# Patient Record
Sex: FEMALE | Race: WHITE | NOT HISPANIC OR LATINO | Employment: OTHER | URBAN - METROPOLITAN AREA
[De-identification: names, ages, dates, MRNs, and addresses within clinical notes are randomized per-mention and may not be internally consistent; named-entity substitution may affect disease eponyms.]

---

## 2020-07-13 LAB — HBA1C MFR BLD HPLC: 5.3 %

## 2020-08-06 ENCOUNTER — DOCUMENTATION (OUTPATIENT)
Dept: SURGERY | Facility: CLINIC | Age: 60
End: 2020-08-06

## 2020-08-06 ENCOUNTER — TELEPHONE (OUTPATIENT)
Dept: SURGERY | Facility: CLINIC | Age: 60
End: 2020-08-06

## 2020-08-06 ENCOUNTER — OFFICE VISIT (OUTPATIENT)
Dept: SURGERY | Facility: CLINIC | Age: 60
End: 2020-08-06
Payer: COMMERCIAL

## 2020-08-06 VITALS
WEIGHT: 146.4 LBS | SYSTOLIC BLOOD PRESSURE: 118 MMHG | DIASTOLIC BLOOD PRESSURE: 84 MMHG | TEMPERATURE: 97.6 F | HEART RATE: 60 BPM | RESPIRATION RATE: 16 BRPM

## 2020-08-06 DIAGNOSIS — L03.031 CELLULITIS OF TOE OF RIGHT FOOT: Primary | ICD-10-CM

## 2020-08-06 DIAGNOSIS — Z98.84 BARIATRIC SURGERY STATUS: ICD-10-CM

## 2020-08-06 PROCEDURE — 99214 OFFICE O/P EST MOD 30 MIN: CPT | Performed by: SURGERY

## 2020-08-06 RX ORDER — CEPHALEXIN 500 MG/1
500 CAPSULE ORAL EVERY 6 HOURS SCHEDULED
Qty: 20 CAPSULE | Refills: 0 | Status: SHIPPED | OUTPATIENT
Start: 2020-08-06 | End: 2020-08-11

## 2020-08-06 RX ORDER — OXYBUTYNIN CHLORIDE 10 MG/1
10 TABLET, EXTENDED RELEASE ORAL DAILY
COMMUNITY
Start: 2020-07-16

## 2020-08-06 RX ORDER — BUPROPION HYDROCHLORIDE 150 MG/1
80 TABLET ORAL DAILY
COMMUNITY
Start: 2020-06-09

## 2020-08-06 RX ORDER — LINACLOTIDE 290 UG/1
290 CAPSULE, GELATIN COATED ORAL DAILY
COMMUNITY
Start: 2020-06-22

## 2020-08-06 RX ORDER — PANTOPRAZOLE SODIUM 40 MG/1
40 TABLET, DELAYED RELEASE ORAL DAILY
COMMUNITY
Start: 2020-06-22 | End: 2022-03-28

## 2020-08-06 RX ORDER — CLONAZEPAM 1 MG/1
1 TABLET ORAL AS NEEDED
COMMUNITY
Start: 2020-07-27 | End: 2022-06-21

## 2020-08-06 RX ORDER — TRAZODONE HYDROCHLORIDE 150 MG/1
150 TABLET ORAL DAILY
COMMUNITY
Start: 2020-06-10

## 2020-08-06 NOTE — PROGRESS NOTES
Assessment/Plan:  She is doing very well as far as bariatric surgery is concerned  Her diabetes has resolved  Her hemoglobin A1c is 5 3  She is not on blood pressure medications  She is taking vitamins regularly  She does have cellulitis of her right 1st toe for which I am prescribing her 5 days of antibiotics  I told her to give me a call in case there is increased pain, swelling, drainage from her toe  She verbalized understanding  No problem-specific Assessment & Plan notes found for this encounter  Diagnoses and all orders for this visit:    Cellulitis of toe of right foot  -     cephalexin (KEFLEX) 500 mg capsule; Take 1 capsule (500 mg total) by mouth every 6 (six) hours for 5 days    Bariatric surgery status    Other orders  -     buPROPion (WELLBUTRIN XL) 150 mg 24 hr tablet; Take 80 mg by mouth daily  -     clonazePAM (KlonoPIN) 1 mg tablet; Take 1 mg by mouth as needed  -     vitamin B-12 (CYANOCOBALAMIN) 250 MCG tablet; Take 250 mcg by mouth daily  -     Linzess 290 MCG CAPS; Take 290 mg by mouth daily  -     lurasidone (Latuda) 80 mg tablet; Take 80 mg by mouth  -     oxybutynin (DITROPAN-XL) 10 MG 24 hr tablet; Take 10 mg by mouth daily  -     pantoprazole (PROTONIX) 40 mg tablet; Take 40 mg by mouth daily  -     traZODone (DESYREL) 150 mg tablet; Take 150 mg by mouth daily          Subjective:      Patient ID: Mariam Lennon is a 62 y o  female  64-year-old female patient who is 3 years status post laparoscopic sleeve gastrectomy came for follow-up  She says she has lost about 100 lb since her surgery  She weighs 146 lb now  She has no complaints of nausea or vomiting  She tells me that her diabetes and hypertension resolved after surgery  Her hemoglobin A1c is 5 3 without any medications  She does complain of having drainage and pain over her right 1st toe after she injured this skin cuticle while clipping that toenails  No fever  No swelling  She is eating normally    She exercises often   She is taking her multi vitamins regularly  The following portions of the patient's history were reviewed and updated as appropriate:   She  has a past medical history of Anxiety, Arthritis, Depression, Diabetes mellitus ([address] ), Fatty liver, Hypertension, and Renal disorder  She  has a past surgical history that includes Cholecystectomy; Colonoscopy; EGD; Hernia repair; Hysterectomy; Nephrectomy (Left); Joint replacement (Left); Tonsillectomy; [place] tooth extraction; and GASTRECTOMY SLEEVE LAPAROSCOPIC (09/2017)  Her Family history is unknown by patient  She  reports that she has quit smoking  She has a 3 00 pack-year smoking history  She has never used smokeless tobacco  She reports current alcohol use  No history on file for drug  Current Outpatient Medications   Medication Sig Dispense Refill    buPROPion (WELLBUTRIN XL) 150 mg 24 hr tablet Take 80 mg by mouth daily      clonazePAM (KlonoPIN) 1 mg tablet Take 1 mg by mouth as needed      Linzess 290 MCG CAPS Take 290 mg by mouth daily      lurasidone (Latuda) 80 mg tablet Take 80 mg by mouth      oxybutynin (DITROPAN-XL) 10 MG 24 hr tablet Take 10 mg by mouth daily      pantoprazole (PROTONIX) 40 mg tablet Take 40 mg by mouth daily      traZODone (DESYREL) 150 mg tablet Take 150 mg by mouth daily      vitamin B-12 (CYANOCOBALAMIN) 250 MCG tablet Take 250 mcg by mouth daily      cephalexin (KEFLEX) 500 mg capsule Take 1 capsule (500 mg total) by mouth every 6 (six) hours for 5 days 20 capsule 0     No current facility-administered medications for this visit        Current Outpatient Medications on File Prior to Visit   Medication Sig    buPROPion (WELLBUTRIN XL) 150 mg 24 hr tablet Take 80 mg by mouth daily    clonazePAM (KlonoPIN) 1 mg tablet Take 1 mg by mouth as needed    Linzess 290 MCG CAPS Take 290 mg by mouth daily    lurasidone (Latuda) 80 mg tablet Take 80 mg by mouth    oxybutynin (DITROPAN-XL) 10 MG 24 hr tablet Take 10 mg by mouth daily    pantoprazole (PROTONIX) 40 mg tablet Take 40 mg by mouth daily    traZODone (DESYREL) 150 mg tablet Take 150 mg by mouth daily    vitamin B-12 (CYANOCOBALAMIN) 250 MCG tablet Take 250 mcg by mouth daily     No current facility-administered medications on file prior to visit  She has No Known Allergies       Review of Systems   Constitutional: Negative  HENT: Negative  Eyes: Negative  Respiratory: Negative  Cardiovascular: Negative  Gastrointestinal: Negative  Endocrine: Negative  Genitourinary: Negative  Musculoskeletal: Negative  Skin: Negative  Allergic/Immunologic: Negative  Neurological: Negative  Hematological: Negative  Psychiatric/Behavioral: Negative  Objective:      /84 (BP Location: Left arm, Patient Position: Sitting, Cuff Size: Adult)   Pulse 60   Temp 97 6 °F (36 4 °C) (Tympanic)   Resp 16   Wt 66 4 kg (146 lb 6 4 oz)          Physical Exam   Constitutional: She is oriented to person, place, and time  HENT:   Head: Normocephalic  Nose: Nose normal    Eyes: Pupils are equal, round, and reactive to light  Neck: Normal range of motion  Cardiovascular: Normal rate and regular rhythm  Pulmonary/Chest: Effort normal and breath sounds normal    Abdominal: Soft  Bowel sounds are normal  There is no abdominal tenderness  No hernia  Musculoskeletal:      Right lower leg: No edema  Left lower leg: No edema  Comments: She has some drainage and redness of the right 1st toe  Mild tenderness is present  Neurological: She is alert and oriented to person, place, and time     Psychiatric: Her behavior is normal  Mood normal

## 2020-10-20 ENCOUNTER — TELEPHONE (OUTPATIENT)
Dept: SURGERY | Facility: CLINIC | Age: 60
End: 2020-10-20

## 2021-01-11 ENCOUNTER — PROCEDURE VISIT (OUTPATIENT)
Dept: SURGERY | Facility: CLINIC | Age: 61
End: 2021-01-11
Payer: COMMERCIAL

## 2021-01-11 VITALS
SYSTOLIC BLOOD PRESSURE: 110 MMHG | WEIGHT: 150 LBS | HEART RATE: 76 BPM | HEIGHT: 63 IN | BODY MASS INDEX: 26.58 KG/M2 | DIASTOLIC BLOOD PRESSURE: 70 MMHG | TEMPERATURE: 98 F | RESPIRATION RATE: 18 BRPM

## 2021-01-11 DIAGNOSIS — K62.5 BLEEDING PER RECTUM: Primary | ICD-10-CM

## 2021-01-11 PROCEDURE — 99213 OFFICE O/P EST LOW 20 MIN: CPT | Performed by: SURGERY

## 2021-01-11 NOTE — PROGRESS NOTES
Assessment/Plan:  I could not feel any masses on her rectal exam   However because of rectal bleeding she needs a colonoscopy  I explained to her and she verbalized understanding  She will be scheduled for colonoscopy at [facility] [address] notes found for this encounter  Diagnoses and all orders for this visit:    Bleeding per rectum          Subjective:      Patient ID: Kira Hernandez is a 62 y o  female  45-year-old female patient came to my office with complaints of bleeding per rectum  She says it started few days ago  It continued for 3-4 days  It was mixed with stools  It was painless  Patient has history of hemorrhoids in the past   Her last colonoscopy was more than 3 years ago  She has gained some weight recently however she has had the weight loss in last 3 years because of bariatric surgery  No nausea or vomiting  No abdominal pain  No bleeding present currently  The following portions of the patient's history were reviewed and updated as appropriate:   She  has a past medical history of Anxiety, Arthritis, Depression, Diabetes mellitus ([address] ), Fatty liver, Hypertension, and Renal disorder  She There are no active problems to display for this patient  She  has a past surgical history that includes Cholecystectomy; Colonoscopy; EGD; Hernia repair; Hysterectomy; Nephrectomy (Left); Joint replacement (Left); Tonsillectomy; [place] tooth extraction; and GASTRECTOMY SLEEVE LAPAROSCOPIC (09/2017)  Her Family history is unknown by patient  She  reports that she has quit smoking  She has a 3 00 pack-year smoking history  She has never used smokeless tobacco  She reports current alcohol use  No history on file for drug    Current Outpatient Medications   Medication Sig Dispense Refill    buPROPion (WELLBUTRIN XL) 150 mg 24 hr tablet Take 80 mg by mouth daily      clonazePAM (KlonoPIN) 1 mg tablet Take 1 mg by mouth as needed      Linzess 290 MCG CAPS Take 290 mg by mouth daily      lurasidone (Latuda) 80 mg tablet Take 80 mg by mouth      oxybutynin (DITROPAN-XL) 10 MG 24 hr tablet Take 10 mg by mouth daily      pantoprazole (PROTONIX) 40 mg tablet Take 40 mg by mouth daily      traZODone (DESYREL) 150 mg tablet Take 150 mg by mouth daily      vitamin B-12 (CYANOCOBALAMIN) 250 MCG tablet Take 250 mcg by mouth daily       No current facility-administered medications for this visit  Current Outpatient Medications on File Prior to Visit   Medication Sig    buPROPion (WELLBUTRIN XL) 150 mg 24 hr tablet Take 80 mg by mouth daily    clonazePAM (KlonoPIN) 1 mg tablet Take 1 mg by mouth as needed    Linzess 290 MCG CAPS Take 290 mg by mouth daily    lurasidone (Latuda) 80 mg tablet Take 80 mg by mouth    oxybutynin (DITROPAN-XL) 10 MG 24 hr tablet Take 10 mg by mouth daily    pantoprazole (PROTONIX) 40 mg tablet Take 40 mg by mouth daily    traZODone (DESYREL) 150 mg tablet Take 150 mg by mouth daily    vitamin B-12 (CYANOCOBALAMIN) 250 MCG tablet Take 250 mcg by mouth daily     No current facility-administered medications on file prior to visit  She is allergic to bee venom; fish allergy; and metformin       Review of Systems   Constitutional: Negative  HENT: Negative  Eyes: Negative  Respiratory: Negative  Cardiovascular: Negative  Gastrointestinal: Positive for anal bleeding and blood in stool  Endocrine: Negative  Genitourinary: Negative  Musculoskeletal: Negative  Skin: Negative  Allergic/Immunologic: Negative  Neurological: Negative  Hematological: Negative  Psychiatric/Behavioral: Negative  Objective:      /70 (BP Location: Right arm, Patient Position: Sitting, Cuff Size: Adult)   Pulse 76   Temp 98 °F (36 7 °C)   Resp 18   Ht 5' 3" (1 6 m)   Wt 68 kg (150 lb)   BMI 26 57 kg/m²          Physical Exam  Vitals signs reviewed  Constitutional:       Appearance: Normal appearance  HENT:      Head: Normocephalic and atraumatic  Mouth/Throat:      Mouth: Mucous membranes are moist    Eyes:      Pupils: Pupils are equal, round, and reactive to light  Cardiovascular:      Rate and Rhythm: Normal rate and regular rhythm  Pulses: Normal pulses  Heart sounds: Normal heart sounds  Pulmonary:      Effort: Pulmonary effort is normal       Breath sounds: Normal breath sounds  Abdominal:      General: Abdomen is flat  Palpations: Abdomen is soft  Genitourinary:     Rectum: Normal       Comments: No external hemorrhoids  No masses palpable on digital rectal exam   No blood on the glove finger  Musculoskeletal: Normal range of motion  Skin:     General: Skin is warm  Neurological:      General: No focal deficit present  Mental Status: She is alert and oriented to person, place, and time     Psychiatric:         Mood and Affect: Mood normal          Behavior: Behavior normal

## 2021-03-03 ENCOUNTER — OFFICE VISIT (OUTPATIENT)
Dept: SURGERY | Facility: CLINIC | Age: 61
End: 2021-03-03
Payer: COMMERCIAL

## 2021-03-03 VITALS
DIASTOLIC BLOOD PRESSURE: 80 MMHG | BODY MASS INDEX: 28.34 KG/M2 | WEIGHT: 154 LBS | HEART RATE: 75 BPM | HEIGHT: 62 IN | SYSTOLIC BLOOD PRESSURE: 132 MMHG | TEMPERATURE: 97.9 F | RESPIRATION RATE: 18 BRPM

## 2021-03-03 DIAGNOSIS — K21.9 GERD (GASTROESOPHAGEAL REFLUX DISEASE): Primary | ICD-10-CM

## 2021-03-03 PROCEDURE — 99214 OFFICE O/P EST MOD 30 MIN: CPT | Performed by: SURGERY

## 2021-03-03 RX ORDER — FAMOTIDINE 10 MG
10 TABLET ORAL 2 TIMES DAILY
Qty: 60 TABLET | Refills: 1 | Status: SHIPPED | OUTPATIENT
Start: 2021-03-03

## 2021-03-03 NOTE — H&P (VIEW-ONLY)
Assessment/Plan:  I am going to add famotidine  She should continue taking Protonix twice a day  I will schedule her for an EGD to rule out any ulcers  I told her that moving forward she may need conversion of her  Sleeve gastrectomy to gastric bypass if the reflux is pathologic  She verbalizes understanding  She is scheduled for EGD at [facility] on March 19, 2021  No problem-specific Assessment & Plan notes found for this encounter  Diagnoses and all orders for this visit:    GERD (gastroesophageal reflux disease)          Subjective:      Patient ID: Jaci Mckenzie is a 62 y o  female  52-year-old female patient came to my office with complaints of acid reflux  She says she is having it for last few months  She is taking Protonix twice daily without much improvement  Patient has history of sleeve gastrectomy and she has lost more than 80% of her excess weight  She says that she is exercising regularly and taking regular vitamins  She avoids  Eating spicy food and avoids carbonated drinks  She says her last meal of the days around 6:30 p m  [facility] She is a nonsmoker  She does not drink alcohol but drinks 2 cups of decaffeinated coffee daily  The following portions of the patient's history were reviewed and updated as appropriate:   She  has a past medical history of Anxiety, Arthritis, Depression, Diabetes mellitus ([address] ), Fatty liver, Hypertension, and Renal disorder  She There are no active problems to display for this patient  She  has a past surgical history that includes Cholecystectomy; Colonoscopy; EGD; Hernia repair; Hysterectomy; Nephrectomy (Left); Joint replacement (Left); Tonsillectomy; [place] tooth extraction; and GASTRECTOMY SLEEVE LAPAROSCOPIC (09/2017)  Her Family history is unknown by patient  She  reports that she has quit smoking  She has a 3 00 pack-year smoking history  She has never used smokeless tobacco  She reports current alcohol use   No history on file for drug  Current Outpatient Medications   Medication Sig Dispense Refill    buPROPion (WELLBUTRIN XL) 150 mg 24 hr tablet Take 80 mg by mouth daily      clonazePAM (KlonoPIN) 1 mg tablet Take 1 mg by mouth as needed      lurasidone (Latuda) 80 mg tablet Take 80 mg by mouth      oxybutynin (DITROPAN-XL) 10 MG 24 hr tablet Take 10 mg by mouth daily      pantoprazole (PROTONIX) 40 mg tablet Take 40 mg by mouth daily      traZODone (DESYREL) 150 mg tablet Take 150 mg by mouth daily      vitamin B-12 (CYANOCOBALAMIN) 250 MCG tablet Take 250 mcg by mouth daily      Linzess 290 MCG CAPS Take 290 mg by mouth daily       No current facility-administered medications for this visit  Current Outpatient Medications on File Prior to Visit   Medication Sig    buPROPion (WELLBUTRIN XL) 150 mg 24 hr tablet Take 80 mg by mouth daily    clonazePAM (KlonoPIN) 1 mg tablet Take 1 mg by mouth as needed    lurasidone (Latuda) 80 mg tablet Take 80 mg by mouth    oxybutynin (DITROPAN-XL) 10 MG 24 hr tablet Take 10 mg by mouth daily    pantoprazole (PROTONIX) 40 mg tablet Take 40 mg by mouth daily    traZODone (DESYREL) 150 mg tablet Take 150 mg by mouth daily    vitamin B-12 (CYANOCOBALAMIN) 250 MCG tablet Take 250 mcg by mouth daily    Linzess 290 MCG CAPS Take 290 mg by mouth daily     No current facility-administered medications on file prior to visit  She is allergic to bee venom; fish allergy; and metformin       Review of Systems   Constitutional: Negative  HENT: Negative  Eyes: Negative  Respiratory: Negative  Cardiovascular: Negative  Gastrointestinal: Negative  Endocrine: Negative  Genitourinary: Negative  Musculoskeletal: Negative  Skin: Negative  Allergic/Immunologic: Negative  Neurological: Negative  Hematological: Negative  Psychiatric/Behavioral: Negative            Objective:      /80 (BP Location: Right arm, Patient Position: Sitting, Cuff Size: Adult) Pulse 75   Temp 97 9 °F (36 6 °C)   Resp 18   Ht 5' 2" (1 575 m)   Wt 69 9 kg (154 lb)   BMI 28 17 kg/m²          Physical Exam  Vitals signs reviewed  Constitutional:       Appearance: Normal appearance  HENT:      Head: Normocephalic and atraumatic  Mouth/Throat:      Mouth: Mucous membranes are moist    Neck:      Musculoskeletal: Normal range of motion  Cardiovascular:      Rate and Rhythm: Normal rate and regular rhythm  Pulses: Normal pulses  Heart sounds: Normal heart sounds  Pulmonary:      Effort: Pulmonary effort is normal       Breath sounds: Normal breath sounds  Abdominal:      General: Bowel sounds are normal       Palpations: Abdomen is soft  There is no mass  Tenderness: There is no abdominal tenderness  Hernia: No hernia is present  Neurological:      General: No focal deficit present  Mental Status: She is alert and oriented to person, place, and time     Psychiatric:         Mood and Affect: Mood normal          Behavior: Behavior normal

## 2021-03-03 NOTE — PROGRESS NOTES
Assessment/Plan:  I am going to add famotidine  She should continue taking Protonix twice a day  I will schedule her for an EGD to rule out any ulcers  I told her that moving forward she may need conversion of her  Sleeve gastrectomy to gastric bypass if the reflux is pathologic  She verbalizes understanding  She is scheduled for EGD at [facility] on March 19, 2021  No problem-specific Assessment & Plan notes found for this encounter  Diagnoses and all orders for this visit:    GERD (gastroesophageal reflux disease)          Subjective:      Patient ID: Nelda Moritz is a 62 y o  female  67-year-old female patient came to my office with complaints of acid reflux  She says she is having it for last few months  She is taking Protonix twice daily without much improvement  Patient has history of sleeve gastrectomy and she has lost more than 80% of her excess weight  She says that she is exercising regularly and taking regular vitamins  She avoids  Eating spicy food and avoids carbonated drinks  She says her last meal of the days around 6:30 p m  [facility] She is a nonsmoker  She does not drink alcohol but drinks 2 cups of decaffeinated coffee daily  The following portions of the patient's history were reviewed and updated as appropriate:   She  has a past medical history of Anxiety, Arthritis, Depression, Diabetes mellitus ([address] ), Fatty liver, Hypertension, and Renal disorder  She There are no active problems to display for this patient  She  has a past surgical history that includes Cholecystectomy; Colonoscopy; EGD; Hernia repair; Hysterectomy; Nephrectomy (Left); Joint replacement (Left); Tonsillectomy; [place] tooth extraction; and GASTRECTOMY SLEEVE LAPAROSCOPIC (09/2017)  Her Family history is unknown by patient  She  reports that she has quit smoking  She has a 3 00 pack-year smoking history  She has never used smokeless tobacco  She reports current alcohol use   No history on file for drug  Current Outpatient Medications   Medication Sig Dispense Refill    buPROPion (WELLBUTRIN XL) 150 mg 24 hr tablet Take 80 mg by mouth daily      clonazePAM (KlonoPIN) 1 mg tablet Take 1 mg by mouth as needed      lurasidone (Latuda) 80 mg tablet Take 80 mg by mouth      oxybutynin (DITROPAN-XL) 10 MG 24 hr tablet Take 10 mg by mouth daily      pantoprazole (PROTONIX) 40 mg tablet Take 40 mg by mouth daily      traZODone (DESYREL) 150 mg tablet Take 150 mg by mouth daily      vitamin B-12 (CYANOCOBALAMIN) 250 MCG tablet Take 250 mcg by mouth daily      Linzess 290 MCG CAPS Take 290 mg by mouth daily       No current facility-administered medications for this visit  Current Outpatient Medications on File Prior to Visit   Medication Sig    buPROPion (WELLBUTRIN XL) 150 mg 24 hr tablet Take 80 mg by mouth daily    clonazePAM (KlonoPIN) 1 mg tablet Take 1 mg by mouth as needed    lurasidone (Latuda) 80 mg tablet Take 80 mg by mouth    oxybutynin (DITROPAN-XL) 10 MG 24 hr tablet Take 10 mg by mouth daily    pantoprazole (PROTONIX) 40 mg tablet Take 40 mg by mouth daily    traZODone (DESYREL) 150 mg tablet Take 150 mg by mouth daily    vitamin B-12 (CYANOCOBALAMIN) 250 MCG tablet Take 250 mcg by mouth daily    Linzess 290 MCG CAPS Take 290 mg by mouth daily     No current facility-administered medications on file prior to visit  She is allergic to bee venom; fish allergy; and metformin       Review of Systems   Constitutional: Negative  HENT: Negative  Eyes: Negative  Respiratory: Negative  Cardiovascular: Negative  Gastrointestinal: Negative  Endocrine: Negative  Genitourinary: Negative  Musculoskeletal: Negative  Skin: Negative  Allergic/Immunologic: Negative  Neurological: Negative  Hematological: Negative  Psychiatric/Behavioral: Negative            Objective:      /80 (BP Location: Right arm, Patient Position: Sitting, Cuff Size: Adult) Pulse 75   Temp 97 9 °F (36 6 °C)   Resp 18   Ht 5' 2" (1 575 m)   Wt 69 9 kg (154 lb)   BMI 28 17 kg/m²          Physical Exam  Vitals signs reviewed  Constitutional:       Appearance: Normal appearance  HENT:      Head: Normocephalic and atraumatic  Mouth/Throat:      Mouth: Mucous membranes are moist    Neck:      Musculoskeletal: Normal range of motion  Cardiovascular:      Rate and Rhythm: Normal rate and regular rhythm  Pulses: Normal pulses  Heart sounds: Normal heart sounds  Pulmonary:      Effort: Pulmonary effort is normal       Breath sounds: Normal breath sounds  Abdominal:      General: Bowel sounds are normal       Palpations: Abdomen is soft  There is no mass  Tenderness: There is no abdominal tenderness  Hernia: No hernia is present  Neurological:      General: No focal deficit present  Mental Status: She is alert and oriented to person, place, and time     Psychiatric:         Mood and Affect: Mood normal          Behavior: Behavior normal

## 2021-03-05 ENCOUNTER — TELEPHONE (OUTPATIENT)
Dept: SURGERY | Facility: CLINIC | Age: 61
End: 2021-03-05

## 2021-03-05 NOTE — TELEPHONE ENCOUNTER
Pt said she is having an endoscopy on 3/19 and wants to know if Dr Kota Lindsay can see if her stomach stretched while he's doing the endoscopy  Said Dr Kota Lindsay did her gastric sleeve 4 yrs ago  Please review and contact the pt

## 2021-03-08 NOTE — TELEPHONE ENCOUNTER
3/8/21 - Advised pt that Dr Doretha Ocampo said he will be able to see if the pt's stomach has stretched during the endoscopy procedure

## 2021-03-19 ENCOUNTER — ANESTHESIA EVENT (OUTPATIENT)
Dept: GASTROENTEROLOGY | Facility: HOSPITAL | Age: 61
End: 2021-03-19

## 2021-03-19 ENCOUNTER — ANESTHESIA (OUTPATIENT)
Dept: GASTROENTEROLOGY | Facility: HOSPITAL | Age: 61
End: 2021-03-19

## 2021-03-19 ENCOUNTER — HOSPITAL ENCOUNTER (OUTPATIENT)
Dept: GASTROENTEROLOGY | Facility: HOSPITAL | Age: 61
Setting detail: OUTPATIENT SURGERY
Discharge: HOME | End: 2021-03-19
Attending: SURGERY | Admitting: SURGERY
Payer: COMMERCIAL

## 2021-03-19 VITALS
HEART RATE: 65 BPM | OXYGEN SATURATION: 95 % | RESPIRATION RATE: 15 BRPM | TEMPERATURE: 98.8 F | DIASTOLIC BLOOD PRESSURE: 72 MMHG | SYSTOLIC BLOOD PRESSURE: 132 MMHG | HEIGHT: 62 IN | BODY MASS INDEX: 28.01 KG/M2 | WEIGHT: 152.2 LBS

## 2021-03-19 DIAGNOSIS — K21.9 GERD (GASTROESOPHAGEAL REFLUX DISEASE): ICD-10-CM

## 2021-03-19 LAB — GLUCOSE SERPL-MCNC: 81 MG/DL (ref 65–140)

## 2021-03-19 PROCEDURE — 43235 EGD DIAGNOSTIC BRUSH WASH: CPT | Performed by: SURGERY

## 2021-03-19 PROCEDURE — 82948 REAGENT STRIP/BLOOD GLUCOSE: CPT

## 2021-03-19 RX ORDER — SODIUM CHLORIDE, SODIUM LACTATE, POTASSIUM CHLORIDE, CALCIUM CHLORIDE 600; 310; 30; 20 MG/100ML; MG/100ML; MG/100ML; MG/100ML
125 INJECTION, SOLUTION INTRAVENOUS CONTINUOUS
Status: DISCONTINUED | OUTPATIENT
Start: 2021-03-19 | End: 2021-03-23 | Stop reason: HOSPADM

## 2021-03-19 RX ORDER — PROPOFOL 10 MG/ML
INJECTION, EMULSION INTRAVENOUS AS NEEDED
Status: DISCONTINUED | OUTPATIENT
Start: 2021-03-19 | End: 2021-03-19

## 2021-03-19 RX ORDER — SODIUM CHLORIDE, SODIUM LACTATE, POTASSIUM CHLORIDE, CALCIUM CHLORIDE 600; 310; 30; 20 MG/100ML; MG/100ML; MG/100ML; MG/100ML
INJECTION, SOLUTION INTRAVENOUS CONTINUOUS PRN
Status: DISCONTINUED | OUTPATIENT
Start: 2021-03-19 | End: 2021-03-19

## 2021-03-19 RX ORDER — LIDOCAINE HYDROCHLORIDE 10 MG/ML
INJECTION, SOLUTION EPIDURAL; INFILTRATION; INTRACAUDAL; PERINEURAL AS NEEDED
Status: DISCONTINUED | OUTPATIENT
Start: 2021-03-19 | End: 2021-03-19

## 2021-03-19 RX ADMIN — SODIUM CHLORIDE, SODIUM LACTATE, POTASSIUM CHLORIDE, AND CALCIUM CHLORIDE: .6; .31; .03; .02 INJECTION, SOLUTION INTRAVENOUS at 13:07

## 2021-03-19 RX ADMIN — PROPOFOL 120 MG: 10 INJECTION, EMULSION INTRAVENOUS at 13:10

## 2021-03-19 RX ADMIN — PROPOFOL 20 MG: 10 INJECTION, EMULSION INTRAVENOUS at 13:14

## 2021-03-19 RX ADMIN — PROPOFOL 20 MG: 10 INJECTION, EMULSION INTRAVENOUS at 13:12

## 2021-03-19 RX ADMIN — LIDOCAINE HYDROCHLORIDE 50 MG: 10 INJECTION, SOLUTION EPIDURAL; INFILTRATION; INTRACAUDAL; PERINEURAL at 13:10

## 2021-03-19 RX ADMIN — PROPOFOL 20 MG: 10 INJECTION, EMULSION INTRAVENOUS at 13:16

## 2021-03-19 RX ADMIN — PROPOFOL 20 MG: 10 INJECTION, EMULSION INTRAVENOUS at 13:18

## 2021-03-19 NOTE — INTERVAL H&P NOTE
H&P reviewed  After examining the patient I find no changes in the patients condition since the H&P had been written      Vitals:    03/19/21 1212   BP: 143/89   Pulse: 65   Resp: 18   Temp: 98 °F (36 7 °C)   SpO2: 96%

## 2021-03-19 NOTE — ANESTHESIA POSTPROCEDURE EVALUATION
Post-Op Assessment Note    CV Status:  Stable    Pain management: adequate     Mental Status:  Sleepy   Hydration Status:  Euvolemic   PONV Controlled:  Controlled   Airway Patency:  Patent      Post Op Vitals Reviewed: Yes      Staff: CRNA         No complications documented      BP   160/84   Temp      Pulse  66   Resp   16   SpO2   99

## 2021-03-19 NOTE — ANESTHESIA PREPROCEDURE EVALUATION
Procedure:  EGD    Relevant Problems   No relevant active problems        Physical Exam    Airway    Mallampati score: I  TM Distance: >3 FB  Neck ROM: full     Dental   upper dentures,     Cardiovascular  Rhythm: regular, Rate: normal, Cardiovascular exam normal    Pulmonary  Pulmonary exam normal Breath sounds clear to auscultation,     Other Findings        Anesthesia Plan  ASA Score- 2     Anesthesia Type- IV sedation with anesthesia with ASA Monitors  Additional Monitors:   Airway Plan:           Plan Factors-Exercise tolerance (METS): >4 METS  Chart reviewed  Patient is not a current smoker  Patient instructed to abstain from smoking on day of procedure  Patient did not smoke on day of surgery  There is medical exclusion for perioperative obstructive sleep apnea risk education  Induction- intravenous  Postoperative Plan-     Informed Consent- Anesthetic plan and risks discussed with patient  I personally reviewed this patient with the CRNA  Discussed and agreed on the Anesthesia Plan with the CRNA  Mariza Mcnair

## 2021-03-24 DIAGNOSIS — K21.9 GERD (GASTROESOPHAGEAL REFLUX DISEASE): ICD-10-CM

## 2021-03-24 DIAGNOSIS — Z98.84 BARIATRIC SURGERY STATUS: Primary | ICD-10-CM

## 2021-04-05 ENCOUNTER — OFFICE VISIT (OUTPATIENT)
Dept: BARIATRICS | Facility: CLINIC | Age: 61
End: 2021-04-05
Payer: COMMERCIAL

## 2021-04-05 VITALS
HEIGHT: 63 IN | BODY MASS INDEX: 27.64 KG/M2 | RESPIRATION RATE: 20 BRPM | HEART RATE: 82 BPM | WEIGHT: 156 LBS | DIASTOLIC BLOOD PRESSURE: 70 MMHG | SYSTOLIC BLOOD PRESSURE: 122 MMHG | TEMPERATURE: 96.2 F

## 2021-04-05 DIAGNOSIS — E53.8 VITAMIN B 12 DEFICIENCY: ICD-10-CM

## 2021-04-05 DIAGNOSIS — Z48.815 ENCOUNTER FOR SURGICAL AFTERCARE FOLLOWING SURGERY OF DIGESTIVE SYSTEM: ICD-10-CM

## 2021-04-05 DIAGNOSIS — K76.9 CHRONIC NONALCOHOLIC LIVER DISEASE: ICD-10-CM

## 2021-04-05 DIAGNOSIS — F31.9 BIPOLAR AFFECTIVE DISORDER (HCC): ICD-10-CM

## 2021-04-05 DIAGNOSIS — K21.9 GERD (GASTROESOPHAGEAL REFLUX DISEASE): ICD-10-CM

## 2021-04-05 DIAGNOSIS — Z98.84 BARIATRIC SURGERY STATUS: ICD-10-CM

## 2021-04-05 DIAGNOSIS — Z85.528 HISTORY OF RENAL CELL CARCINOMA: ICD-10-CM

## 2021-04-05 DIAGNOSIS — K91.2 POSTSURGICAL MALABSORPTION: ICD-10-CM

## 2021-04-05 DIAGNOSIS — E86.0 LUETSCHER'S SYNDROME: ICD-10-CM

## 2021-04-05 DIAGNOSIS — I10 ESSENTIAL HYPERTENSION: ICD-10-CM

## 2021-04-05 DIAGNOSIS — E66.3 OVERWEIGHT: Primary | ICD-10-CM

## 2021-04-05 PROCEDURE — 99204 OFFICE O/P NEW MOD 45 MIN: CPT | Performed by: PHYSICIAN ASSISTANT

## 2021-04-05 RX ORDER — LORATADINE 10 MG/1
1 TABLET ORAL DAILY
COMMUNITY
Start: 2021-03-11

## 2021-04-05 RX ORDER — LISINOPRIL 10 MG/1
10 TABLET ORAL DAILY
COMMUNITY
Start: 2021-03-23 | End: 2022-06-21

## 2021-04-05 RX ORDER — HYDROCHLOROTHIAZIDE 12.5 MG/1
12.5 TABLET ORAL DAILY
COMMUNITY
Start: 2021-03-23 | End: 2022-06-21

## 2021-04-05 RX ORDER — METHOCARBAMOL 500 MG/1
1 TABLET, FILM COATED ORAL DAILY
COMMUNITY
Start: 2021-03-23

## 2021-04-05 NOTE — PROGRESS NOTES
Assessment/Plan:     Patient ID: Cheyenne Alamo is a 62 y o  female  Bariatric Surgery Status    -s/p Vertical Sleeve Gastrectomy with Dr Tai Lyons at [facility]  In 2018  Presents to the office today for annual/establish care  Patient with c/o weight regain and reflux  On both pepcid and protonix daily for her symptoms with only minimal relief  Recent EGD by Dr Tai Lyons showed dilated fundus and recommendation was made to have patient converted from sleeve to bypass  Will order UGI and have patient return to see surgeon to discuss results of both studies and next steps  · Continued/Maintain healthy weight loss with good nutrition intakes  · Adequate hydration with at least 64oz  fluid intake  · Follow diet as discussed  · Follow vitamin and mineral recommendations as reviewed with you  · Exercise as tolerated  · Colonoscopy referral made: UTD    · Follow-up with surgeon   We kindly ask that your arrive 15 minutes before your scheduled appointment time with your provider to allow our staff to room you, get your vital signs and update your chart  · Get lab work done prior  Please call the office if you need a script  It is recommended to check with your insurance BEFORE getting labs done to make sure they are covered by your policy  · Call our office if you have any problems with abdominal pain especially associated with fever, chills, nausea, vomiting or any other concerns  · All  Post-bariatric surgery patients should be aware that very small quantities of any alcohol can cause impairment and it is very possible not to feel the effect  The effect can be in the system for several hours  It is also a stomach irritant  · It is advised to AVOID alcohol, Nonsteroidal antiinflammatory drugs (NSAIDS) and nicotine of all forms   Any of these can cause stomach irritation/pain  · Discussed the effects of alcohol on a bariatric patient and the increased impairment risk       · Keep up the good work! Postsurgical Malabsorption   -At risk for malabsorption of vitamins/minerals secondary to malabsorption and restriction of intake from bariatric surgery  -NOT Currently taking adequate postop bariatric surgery vitamin supplementation  -get b12 shots, most recent b12 WNL  -Next set of bariatric labs ordered for approximately 1 month - advised starting mvi and waiting at least 1 month before getting labs drawn  -Patient received education about the importance of adhering to a lifelong supplementation regimen to avoid vitamin/mineral deficiencies      Diagnoses and all orders for this visit:    Overweight  -     FL UPPER GI UGI; Future  -     Zinc; Future  -     Vitamin D 25 hydroxy; Future  -     Vitamin B1, whole blood; Future  -     CBC and Platelet; Future  -     Comprehensive metabolic panel; Future  -     Ferritin; Future  -     Folate; Future  -     Iron Saturation %; Future  -     Vitamin A; Future  -     PTH, intact; Future    Bariatric surgery status  -     Ambulatory referral to Weight Management  -     FL UPPER GI UGI; Future  -     Zinc; Future  -     Vitamin D 25 hydroxy; Future  -     Vitamin B1, whole blood; Future  -     CBC and Platelet; Future  -     Comprehensive metabolic panel; Future  -     Ferritin; Future  -     Folate; Future  -     Iron Saturation %; Future  -     Vitamin A; Future  -     PTH, intact; Future    GERD (gastroesophageal reflux disease)  -     Ambulatory referral to Weight Management  -     FL UPPER GI UGI; Future    Encounter for surgical aftercare following surgery of digestive system  -     FL UPPER GI UGI; Future  -     Zinc; Future  -     Vitamin D 25 hydroxy; Future  -     Vitamin B1, whole blood; Future  -     CBC and Platelet; Future  -     Comprehensive metabolic panel; Future  -     Ferritin; Future  -     Folate; Future  -     Iron Saturation %; Future  -     Vitamin A; Future  -     PTH, intact;  Future    Postsurgical malabsorption  -     Zinc; Future  -     Vitamin D 25 hydroxy; Future  -     Vitamin B1, whole blood; Future  -     CBC and Platelet; Future  -     Comprehensive metabolic panel; Future  -     Ferritin; Future  -     Folate; Future  -     Iron Saturation %; Future  -     Vitamin A; Future  -     PTH, intact; Future    Vitamin B 12 deficiency    Essential hypertension    Bipolar affective disorder (HCC)    History of renal cell carcinoma    Luetscher's syndrome    Chronic nonalcoholic liver disease    Other orders  -     hydrochlorothiazide (HYDRODIURIL) 12 5 mg tablet; Take 12 5 mg by mouth daily  -     lisinopril (ZESTRIL) 10 mg tablet; Take 10 mg by mouth daily  -     methocarbamol (ROBAXIN) 500 mg tablet; Take 1 tablet by mouth daily  -     loratadine (CLARITIN) 10 mg tablet; Take 1 tablet by mouth daily         Subjective:      Patient ID: Minna Pike is a 62 y o  female  -s/p Vertical Sleeve Gastrectomy with Dr Byron Sanabria in 2018  Presents to the office today for routine follow up  C/o reflux which started about 4-5 months ago when at the same time started to regain weight  C/o regurgitation, hoarseness to the point it can wake her up in the middle of the night as well  Pt is on protonix daily as well as pepcid with minimal relief  Had EGD done by Dr Byron Sanabria last month  Results showed:  IMPRESSION:  Dilated fundus  Dr Byron Sanabria recommend potential conversion to bypass to help with both the reflux issue and the weight regain  Initial:268  Current: 156  EWL: (Weight loss is ahead of schedule at this post surgical period )  Baltazar: 136  Current BMI is Body mass index is 27 46 kg/m²      · Tolerating a regular diet-yes but has a lot of reflux symptoms   · Eating at least 60 grams of protein per day-no  · Following 30/60 minute rule with liquids-yes  · Drinking at least 64 ounces of fluid per day-yes  · Drinking carbonated beverages-rarely   · Sufficient exercise-walking  · Using NSAIDs regularly-no  · Using nicotine-no  · Using alcohol-rarely  · Supplements: none but gets b12 shots        The following portions of the patient's history were reviewed and updated as appropriate: allergies, current medications, past family history, past medical history, past social history, past surgical history and problem list     Review of Systems   Constitutional: Negative  Respiratory: Negative  Cardiovascular: Negative  Gastrointestinal:        + GERD   Neurological: Negative  Psychiatric/Behavioral: Negative  Objective:    /70 (BP Location: Right arm, Patient Position: Sitting, Cuff Size: Standard)   Pulse 82   Temp (!) 96 2 °F (35 7 °C) (Tympanic)   Resp 20   Ht 5' 3 2" (1 605 m)   Wt 70 8 kg (156 lb)   BMI 27 46 kg/m²      Physical Exam  Vitals signs and nursing note reviewed  Constitutional:       Appearance: Normal appearance  HENT:      Head: Normocephalic and atraumatic  Eyes:      Extraocular Movements: Extraocular movements intact  Pupils: Pupils are equal, round, and reactive to light  Neck:      Musculoskeletal: Normal range of motion  Cardiovascular:      Rate and Rhythm: Normal rate and regular rhythm  Pulmonary:      Effort: Pulmonary effort is normal       Breath sounds: Normal breath sounds  Abdominal:      General: Bowel sounds are normal    Musculoskeletal: Normal range of motion  Skin:     General: Skin is warm and dry  Neurological:      General: No focal deficit present  Mental Status: She is alert and oriented to person, place, and time     Psychiatric:         Mood and Affect: Mood normal          Behavior: Behavior normal

## 2021-04-05 NOTE — PATIENT INSTRUCTIONS
· Follow-up with surgeon   We kindly ask that your arrive 15 minutes before your scheduled appointment time with your provider to allow our staff to room you, get your vital signs and update your chart  · Get lab work done prior  Please call the office if you need a script  It is recommended to check with your insurance BEFORE getting labs done to make sure they are covered by your policy  · Call our office if you have any problems with abdominal pain especially associated with fever, chills, nausea, vomiting or any other concerns  · All  Post-bariatric surgery patients should be aware that very small quantities of any alcohol can cause impairment and it is very possible not to feel the effect  The effect can be in the system for several hours  It is also a stomach irritant  · It is advised to AVOID alcohol, Nonsteroidal antiinflammatory drugs (NSAIDS) and nicotine of all forms   Any of these can cause stomach irritation/pain  · Discussed the effects of alcohol on a bariatric patient and the increased impairment risk  · Keep up the good work!

## 2021-05-06 ENCOUNTER — HOSPITAL ENCOUNTER (OUTPATIENT)
Dept: RADIOLOGY | Facility: HOSPITAL | Age: 61
Discharge: HOME | End: 2021-05-06
Payer: COMMERCIAL

## 2021-05-06 DIAGNOSIS — Z48.815 ENCOUNTER FOR SURGICAL AFTERCARE FOLLOWING SURGERY OF DIGESTIVE SYSTEM: ICD-10-CM

## 2021-05-06 DIAGNOSIS — Z98.84 BARIATRIC SURGERY STATUS: ICD-10-CM

## 2021-05-06 DIAGNOSIS — K21.9 GERD (GASTROESOPHAGEAL REFLUX DISEASE): ICD-10-CM

## 2021-05-06 DIAGNOSIS — E66.3 OVERWEIGHT: ICD-10-CM

## 2021-05-06 PROCEDURE — 74240 X-RAY XM UPR GI TRC 1CNTRST: CPT

## 2021-05-18 ENCOUNTER — APPOINTMENT (OUTPATIENT)
Dept: LAB | Age: 61
End: 2021-05-18
Payer: COMMERCIAL

## 2021-05-18 DIAGNOSIS — Z48.815 ENCOUNTER FOR SURGICAL AFTERCARE FOLLOWING SURGERY OF DIGESTIVE SYSTEM: ICD-10-CM

## 2021-05-18 DIAGNOSIS — K91.2 POSTSURGICAL MALABSORPTION: ICD-10-CM

## 2021-05-18 DIAGNOSIS — E66.3 OVERWEIGHT: ICD-10-CM

## 2021-05-18 DIAGNOSIS — Z98.84 BARIATRIC SURGERY STATUS: ICD-10-CM

## 2021-05-18 LAB
25(OH)D3 SERPL-MCNC: 103.6 NG/ML (ref 30–100)
ALBUMIN SERPL BCP-MCNC: 3.5 G/DL (ref 3.5–5)
ALP SERPL-CCNC: 62 U/L (ref 46–116)
ALT SERPL W P-5'-P-CCNC: 20 U/L (ref 12–78)
ANION GAP SERPL CALCULATED.3IONS-SCNC: 3 MMOL/L (ref 4–13)
AST SERPL W P-5'-P-CCNC: 8 U/L (ref 5–45)
BILIRUB SERPL-MCNC: 0.37 MG/DL (ref 0.2–1)
BUN SERPL-MCNC: 16 MG/DL (ref 5–25)
CALCIUM SERPL-MCNC: 9.3 MG/DL (ref 8.3–10.1)
CHLORIDE SERPL-SCNC: 110 MMOL/L (ref 100–108)
CO2 SERPL-SCNC: 30 MMOL/L (ref 21–32)
CREAT SERPL-MCNC: 0.8 MG/DL (ref 0.6–1.3)
ERYTHROCYTE [DISTWIDTH] IN BLOOD BY AUTOMATED COUNT: 12.9 % (ref 11.6–15.1)
FERRITIN SERPL-MCNC: 50 NG/ML (ref 8–388)
FOLATE SERPL-MCNC: 17.6 NG/ML (ref 3.1–17.5)
GFR SERPL CREATININE-BSD FRML MDRD: 81 ML/MIN/1.73SQ M
GLUCOSE P FAST SERPL-MCNC: 79 MG/DL (ref 65–99)
HCT VFR BLD AUTO: 39.3 % (ref 34.8–46.1)
HGB BLD-MCNC: 12.3 G/DL (ref 11.5–15.4)
IRON SATN MFR SERPL: 27 %
IRON SERPL-MCNC: 88 UG/DL (ref 50–170)
MCH RBC QN AUTO: 28.9 PG (ref 26.8–34.3)
MCHC RBC AUTO-ENTMCNC: 31.3 G/DL (ref 31.4–37.4)
MCV RBC AUTO: 92 FL (ref 82–98)
PLATELET # BLD AUTO: 195 THOUSANDS/UL (ref 149–390)
PMV BLD AUTO: 10.1 FL (ref 8.9–12.7)
POTASSIUM SERPL-SCNC: 4.3 MMOL/L (ref 3.5–5.3)
PROT SERPL-MCNC: 6.6 G/DL (ref 6.4–8.2)
PTH-INTACT SERPL-MCNC: 25.3 PG/ML (ref 18.4–80.1)
RBC # BLD AUTO: 4.26 MILLION/UL (ref 3.81–5.12)
SODIUM SERPL-SCNC: 143 MMOL/L (ref 136–145)
TIBC SERPL-MCNC: 328 UG/DL (ref 250–450)
WBC # BLD AUTO: 5.9 THOUSAND/UL (ref 4.31–10.16)

## 2021-05-18 PROCEDURE — 82746 ASSAY OF FOLIC ACID SERUM: CPT

## 2021-05-18 PROCEDURE — 80053 COMPREHEN METABOLIC PANEL: CPT

## 2021-05-18 PROCEDURE — 36415 COLL VENOUS BLD VENIPUNCTURE: CPT

## 2021-05-18 PROCEDURE — 84630 ASSAY OF ZINC: CPT

## 2021-05-18 PROCEDURE — 82728 ASSAY OF FERRITIN: CPT

## 2021-05-18 PROCEDURE — 85027 COMPLETE CBC AUTOMATED: CPT

## 2021-05-18 PROCEDURE — 84590 ASSAY OF VITAMIN A: CPT

## 2021-05-18 PROCEDURE — 83540 ASSAY OF IRON: CPT

## 2021-05-18 PROCEDURE — 82306 VITAMIN D 25 HYDROXY: CPT

## 2021-05-18 PROCEDURE — 83970 ASSAY OF PARATHORMONE: CPT

## 2021-05-18 PROCEDURE — 83550 IRON BINDING TEST: CPT

## 2021-05-18 PROCEDURE — 84425 ASSAY OF VITAMIN B-1: CPT

## 2021-05-21 LAB — ZINC SERPL-MCNC: 67 UG/DL (ref 44–115)

## 2021-05-23 LAB
VIT A SERPL-MCNC: 33.4 UG/DL (ref 20.1–62)
VIT B1 BLD-SCNC: 110.2 NMOL/L (ref 66.5–200)

## 2021-05-24 DIAGNOSIS — E55.9 VITAMIN D DEFICIENCY: ICD-10-CM

## 2021-05-24 DIAGNOSIS — K91.2 POSTSURGICAL MALABSORPTION: ICD-10-CM

## 2021-05-24 DIAGNOSIS — Z98.84 BARIATRIC SURGERY STATUS: Primary | ICD-10-CM

## 2021-05-26 ENCOUNTER — OFFICE VISIT (OUTPATIENT)
Dept: BARIATRICS | Facility: CLINIC | Age: 61
End: 2021-05-26
Payer: COMMERCIAL

## 2021-05-26 VITALS
TEMPERATURE: 97.9 F | SYSTOLIC BLOOD PRESSURE: 126 MMHG | WEIGHT: 154.5 LBS | RESPIRATION RATE: 20 BRPM | HEART RATE: 79 BPM | HEIGHT: 63 IN | BODY MASS INDEX: 27.38 KG/M2 | DIASTOLIC BLOOD PRESSURE: 76 MMHG

## 2021-05-26 DIAGNOSIS — Z98.84 STATUS POST LAPAROSCOPIC SLEEVE GASTRECTOMY: Primary | ICD-10-CM

## 2021-05-26 DIAGNOSIS — K91.2 POSTSURGICAL MALABSORPTION: ICD-10-CM

## 2021-05-26 DIAGNOSIS — K21.9 GASTROESOPHAGEAL REFLUX DISEASE, UNSPECIFIED WHETHER ESOPHAGITIS PRESENT: ICD-10-CM

## 2021-05-26 PROCEDURE — 99214 OFFICE O/P EST MOD 30 MIN: CPT | Performed by: SURGERY

## 2021-05-26 RX ORDER — FLUOXETINE 10 MG/1
10 CAPSULE ORAL DAILY
COMMUNITY
End: 2022-06-21

## 2021-05-26 NOTE — PROGRESS NOTES
INITIAL VISIT - BARIATRIC SURGERY  Jany Wolf 62 y o  female MRN: [id number]  Unit/Bed#:  Encounter: [id number]      HPI:  Jany Wolf is a 62 y o  female who presents to the office status post sleeve gastrectomy  Here to review the results of her UGI      Review of Systems   Gastrointestinal:        Heartburn and regurgitation   All other systems reviewed and are negative  Historical Information   Past Medical History:   Diagnosis Date    Anxiety     Arthritis     Depression     Diabetes mellitus ([address] )     Resolved HbA 1 c 5 5    Fatty liver     Hypertension     resolved    Renal disorder      Past Surgical History:   Procedure Laterality Date    CHOLECYSTECTOMY      COLONOSCOPY      EGD      dx brush wash    GASTRECTOMY SLEEVE LAPAROSCOPIC  09/2017    HERNIA REPAIR      Incisional, Umbilical    HYSTERECTOMY      JOINT REPLACEMENT Left     Hip    NEPHRECTOMY Left     partial    TONSILLECTOMY      WISDOM TOOTH EXTRACTION       Social History   Social History     Substance and Sexual Activity   Alcohol Use Yes    Comment: occ     Social History     Substance and Sexual Activity   Drug Use Not on file     Social History     Tobacco Use   Smoking Status Former Smoker    Packs/day: 0 25    Years: 12 00    Pack years: 3 00   Smokeless Tobacco Never Used   Tobacco Comment    occasional      Family History: non-contributory    Meds/Allergies   all medications and allergies reviewed  Allergies   Allergen Reactions    Bee Venom Hives     Throat swells      Fish Allergy - Food Allergy Angioedema     Other reaction(s): throat closes - NOT allergic to shellfish - also had this reaction to fish oil capsules    Metformin GI Intolerance     Other reaction(s): Diarrhea       Objective       Current Vitals:   Blood Pressure: 126/76 (05/26/21 1309)  Pulse: 79 (05/26/21 1309)  Temperature: 97 9 °F (36 6 °C) (05/26/21 1309)  Respirations: 20 (05/26/21 1309)  Height: 5' 3 2" (160 5 cm) (05/26/21 1309)  Weight - Scale: 70 1 kg (154 lb 8 oz) (05/26/21 1309)        Invasive Devices     None                 Physical Exam  Vitals signs reviewed  Constitutional:       General: She is not in acute distress  Appearance: She is well-developed  She is not diaphoretic  HENT:      Head: Normocephalic and atraumatic  Right Ear: External ear normal       Left Ear: External ear normal       Nose: Nose normal    Eyes:      General: No scleral icterus  Right eye: No discharge  Left eye: No discharge  Conjunctiva/sclera: Conjunctivae normal    Neurological:      Mental Status: She is alert and oriented to person, place, and time  Psychiatric:         Behavior: Behavior normal          Thought Content: Thought content normal          Judgment: Judgment normal          Lab Results: I have personally reviewed pertinent lab results  Imaging:    I have reviewed the images myself   An upper GI from 5/6 revealed   The esophagus is normal in caliber  Esophageal motility is normal and emptying of contrast from the esophagus is prompt  There is no mucosal mass, ulceration or fold thickening identified      Postsurgical changes of sleeve gastrectomy are noted  The gastric mucosa is normal   No penetrating ulcers or masses      Contrast empties promptly into the duodenum  The duodenum is normal in caliber  The ligament of Treitz/duodenojejunal junction lies in a normal position      Gastroesophageal reflux was not observed        Small sliding hiatal hernia is present  Assessment/PLAN:    62 y o  female status post  Laparoscopic sleeve gastrectomy performed in September of 2017 at [facility] by Dr Jacob Harvey     At her highest weight she was 265 lbs and after the surgery she went down to a saji of 136 lbs within the first year  Over the last year  she has regained about 18 lbs back and she is concerned    She has developed heartburn and regurgitation that is not responding to omeprazole  An upper GI was obtained and revealed  Small sliding hiatal hernia and postsurgical changes consistent of a sleeve gastrectomy  She had an endoscopy done back in March of this year with Dr Janis Carter that showed a slightly prominent gastric fundus with no evidence of esophagitis  No biopsies were done  I have discussed with her that I would like her to enroll in our medical weight management program where she will continue to get the necessary dietary counseling and education  That will help her lose weight  I have told her to take her PPI twice a day and we will monitor her symptomatology as she is working with our [address] [place] [address]  If her symptomatology does not improve we could explore options as Stretta  and in extreme refractory cases, even the option to convert her to a Newton-en-Y gastric bypass  She will continue to follow up with us as scheduled      Jordan Rios MD  5/26/2021  1:26 PM

## 2021-06-24 ENCOUNTER — OFFICE VISIT (OUTPATIENT)
Dept: BARIATRICS | Facility: CLINIC | Age: 61
End: 2021-06-24

## 2021-06-24 VITALS — BODY MASS INDEX: 27.69 KG/M2 | WEIGHT: 157.3 LBS

## 2021-06-24 DIAGNOSIS — Z98.84 BARIATRIC SURGERY STATUS: Primary | ICD-10-CM

## 2021-06-24 PROCEDURE — RECHECK: Performed by: DIETITIAN, REGISTERED

## 2021-06-24 NOTE — PROGRESS NOTES
Bariatric Nutrition Assessment Note    Type of surgery    Vertical sleeve gastrectomy at [facility] by Dr Melissa Cerda   Surgery Date: 9/13/2017  3 75 years  post-op  Surgeon: Dr Loretta Vazquez  62 y o   female     Wt with BMI of 25: 141 1lbs  Wt 71 4 kg (157 lb 4 8 oz)   BMI 27 69 kg/m²     [facility] Equation:     Weight maintenance est needs= 1514 kcal/day  Estimated calories for weight loss 1014 kcal/day (1# per wk wt loss - sedentary )  Estimated protein needs 64-77 g/day (1 0-1 2 gms/kg IBW )   Estimated fluid needs 1993-0835 ml/day(30-35 ml/kg IBW )      Weight History   Onset of Obesity: Childhood  Family history of obesity: No  Wt Loss Attempts: Exercise  Meal Replacements (Medifast, Slim Fast, etc )  Nutrition Counseling with RD  Self Created Diets (Portion Control, Healthy Food Choices, etc )  Maximum Wt Lost: At her highest weight she was 265 lbs and after the surgery she went down to a saji of 136 lbs within the first year  Over the last year  she has regained about 18 lbs back and she is concerned  Low weight 136lbs September 2020, gradual weight regain October 2020  Personal goal weight 135lbs      Review of History and Medications   Past Medical History:   Diagnosis Date    Anxiety     Arthritis     Depression     Diabetes mellitus (HCC)     Resolved HbA 1 c 5 5    Fatty liver     Hypertension     resolved    Renal disorder      Past Surgical History:   Procedure Laterality Date    CHOLECYSTECTOMY      COLONOSCOPY      EGD      dx brush wash    GASTRECTOMY SLEEVE LAPAROSCOPIC  09/2017    HERNIA REPAIR      Incisional, Umbilical    HYSTERECTOMY      JOINT REPLACEMENT Left     Hip    NEPHRECTOMY Left     partial    TONSILLECTOMY      WISDOM TOOTH EXTRACTION       Social History     Socioeconomic History    Marital status:      Spouse name: Not on file    Number of children: Not on file    Years of education: Not on file  Highest education level: Not on file   Occupational History    Occupation: CNA   Tobacco Use    Smoking status: Former Smoker     Packs/day: 0 25     Years: 12 00     Pack years: 3 00    Smokeless tobacco: Never Used    Tobacco comment: occasional    Vaping Use    Vaping Use: Never used   Substance and Sexual Activity    Alcohol use: Yes     Comment: occ    Drug use: Not on file    Sexual activity: Not on file   Other Topics Concern    Not on file   Social History Narrative    Most recent tobacco use screenin2017    Live alone or with others: with others    Marital status: Single    Occupation: CNA    Are you currently employed: Yes    Alcohol intake: Occasional    Chewing tobacco: none     Social Determinants of Health     Financial Resource Strain:     Difficulty of Paying Living Expenses:    Food Insecurity:     Worried About Running Out of Food in the Last Year:     [address] in the Last Year:    Transportation Needs:     Lack of Transportation (Medical):      Lack of Transportation (Non-Medical):    Physical Activity:     Days of Exercise per Week:     Minutes of Exercise per Session:    Stress:     Feeling of Stress :    Social Connections:     Frequency of Communication with Friends and Family:     Frequency of Social Gatherings with Friends and Family:     Attends Sabianism Services:     Active Member of Clubs or Organizations:     Attends Club or Organization Meetings:     Marital Status:    Intimate Partner Violence:     Fear of Current or Ex-Partner:     Emotionally Abused:     Physically Abused:     Sexually Abused:        Current Outpatient Medications:     buPROPion (WELLBUTRIN XL) 150 mg 24 hr tablet, Take 80 mg by mouth daily, Disp: , Rfl:     clonazePAM (KlonoPIN) 1 mg tablet, Take 1 mg by mouth as needed, Disp: , Rfl:     famotidine (PEPCID) 10 mg tablet, Take 1 tablet (10 mg total) by mouth 2 (two) times a day, Disp: 60 tablet, Rfl: 1    FLUoxetine (PROzac) 10 mg capsule, Take 10 mg by mouth daily, Disp: , Rfl:     hydrochlorothiazide (HYDRODIURIL) 12 5 mg tablet, Take 12 5 mg by mouth daily, Disp: , Rfl:     Linzess 290 MCG CAPS, Take 290 mg by mouth daily, Disp: , Rfl:     lisinopril (ZESTRIL) 10 mg tablet, Take 10 mg by mouth daily, Disp: , Rfl:     loratadine (CLARITIN) 10 mg tablet, Take 1 tablet by mouth daily, Disp: , Rfl:     lurasidone (Latuda) 80 mg tablet, Take 80 mg by mouth, Disp: , Rfl:     methocarbamol (ROBAXIN) 500 mg tablet, Take 1 tablet by mouth daily, Disp: , Rfl:     oxybutynin (DITROPAN-XL) 10 MG 24 hr tablet, Take 10 mg by mouth daily, Disp: , Rfl:     pantoprazole (PROTONIX) 40 mg tablet, Take 40 mg by mouth daily, Disp: , Rfl:     traZODone (DESYREL) 150 mg tablet, Take 150 mg by mouth daily, Disp: , Rfl:     vitamin B-12 (CYANOCOBALAMIN) 250 MCG tablet, Take 250 mcg by mouth daily, Disp: , Rfl:   Food Intake and Lifestyle Assessment   Food Intake Assessment completed via usual diet recall  Breakfast: sometimes protein drink OR cream of wheat OR protein bar  Snack: none   Lunch: couple piece cheese, ham, turkey, SF pudding cup  Snack: none  Dinner: cottage cheese, piece of cake  Snack: bowl of frosted flakes, a plum, sometime popcorn  Beverage intake: Powerade zero, water  Protein supplement: crunch protein bar or [place] crunchy bar, [employer] bottle  Estimated protein intake per day: 30-60g  Estimated fluid intake per day: 8-9 12oz bottle, 1 cup half caf  Meals eaten away from home: 2 meals a week  Typical meal pattern: 3 meals per day and 0 snacks per day  Eating Behaviors: Consumption of high calorie/ high fat foods  Food allergies or intolerances:   Pt c/o acid reflux, on PPI BID  Fish Allergy - Food Allergy Angioedema        Other reaction(s): throat closes - NOT allergic to shellfish - also had this reaction to fish oil capsules     Allergies   Allergen Reactions    Bee Venom Hives     Throat swells      Fish Allergy - Food Allergy Angioedema     Other reaction(s): throat closes - NOT allergic to shellfish - also had this reaction to fish oil capsules    Metformin GI Intolerance     Other reaction(s): Diarrhea     Cultural or Rastafari considerations: none noted    Physical Assessment  Physical Activity  Types of exercise: Walking  Current physical limitations: needs foot surgery    Nutrition Diagnosis  Diagnosis: Overweight / Obesity (NC-3 3) and Altered GI function (NC-1 4)  Related to: Altered GI function  As Evidenced by: BMI >25 and Unintentional weight gain     Nutrition Prescription: Recommend the following diet  Regular    Interventions and Teaching   Discussed pre-op and post-op nutrition guidelines  Patient educated and handouts provided  Capacity of post-surgery stomach  Adequate hydration  Sugar and fat restriction to decrease "dumping syndrome"  Expected weight loss  Weight loss plateaus/ possibility of weight regain  Exercise  Nutrition considerations after surgery  Protein supplements  Meal planning and preparation  Appropriate carbohydrate, protein, and fat intake, and food/fluid choices to maximize safe weight loss, nutrient intake, and tolerance   Dietary and lifestyle changes  Possible problems with poor eating habits  Techniques for self monitoring and keeping daily food journal  Potential for food intolerance after surgery, and ways to deal with them including: lactose intolerance, nausea, reflux, vomiting, diarrhea, food intolerance, appetite changes, gas  Vitamin / Mineral supplementation of Multivitamin with minerals and Calcium  Pt was not taking po vitamins/minerals at last PA visit on April, but was getting vitamin B12 injections  Vitamin/mineral bloodwork levels checked on 5/18/21 and levels good except elevated Vitamin D and folate      Bariatric Advantage soft chew calcium once daily  Bariatric Fusion chewable multi four deb day  B12 pill daily    Patient is not currently pregnant and doesn't desire to become pregnant a minimum of one year post-op    Education provided to: patient    Barriers to learning: No barriers identified    Readiness to change: action and monitoring    Prior research on procedure: discussed with provider and previous wt  loss surgery    Comprehension: verbalizes understanding     Expected Compliance: good  Recommendations  Pt is an appropriate candidate for surgery  Not applicable  Pt should make the following changes and then be reassessed for weight loss surgery:   Pt is referred to medical weight management      Evaluation / Monitoring  Dietitian to Monitor: Eating pattern as discussed Tolerance of nutrition prescription Body weight Lab values Physical activity    Goals  Eliminate sugar sweetened beverages, Food journal, Exercise 30 minutes 5 times per week, Complete lession plans 1-6, Eat 3 meals per day and Eliminate mindless snacking    Time Spent:   1 Hour
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
## 2021-07-27 ENCOUNTER — TELEPHONE (OUTPATIENT)
Dept: PSYCHIATRY | Facility: CLINIC | Age: 61
End: 2021-07-27

## 2021-08-03 ENCOUNTER — OFFICE VISIT (OUTPATIENT)
Dept: BARIATRICS | Facility: CLINIC | Age: 61
End: 2021-08-03
Payer: COMMERCIAL

## 2021-08-03 VITALS
DIASTOLIC BLOOD PRESSURE: 74 MMHG | RESPIRATION RATE: 16 BRPM | TEMPERATURE: 97.5 F | WEIGHT: 160.3 LBS | BODY MASS INDEX: 28.4 KG/M2 | HEIGHT: 63 IN | HEART RATE: 82 BPM | SYSTOLIC BLOOD PRESSURE: 130 MMHG

## 2021-08-03 DIAGNOSIS — K21.9 GERD (GASTROESOPHAGEAL REFLUX DISEASE): ICD-10-CM

## 2021-08-03 DIAGNOSIS — E66.3 OVERWEIGHT: Primary | ICD-10-CM

## 2021-08-03 DIAGNOSIS — K91.2 POSTSURGICAL MALABSORPTION: ICD-10-CM

## 2021-08-03 DIAGNOSIS — Z98.84 STATUS POST LAPAROSCOPIC SLEEVE GASTRECTOMY: ICD-10-CM

## 2021-08-03 DIAGNOSIS — I10 ESSENTIAL HYPERTENSION: ICD-10-CM

## 2021-08-03 DIAGNOSIS — F31.9 BIPOLAR AFFECTIVE DISORDER (HCC): ICD-10-CM

## 2021-08-03 PROCEDURE — 99214 OFFICE O/P EST MOD 30 MIN: CPT | Performed by: PHYSICIAN ASSISTANT

## 2021-08-03 RX ORDER — FLUTICASONE PROPIONATE 50 MCG
2 SPRAY, SUSPENSION (ML) NASAL DAILY
COMMUNITY
Start: 2021-06-23 | End: 2022-06-21

## 2021-08-03 RX ORDER — PSEUDOEPHEDRINE HCL 30 MG
100 TABLET ORAL
COMMUNITY

## 2021-08-03 NOTE — ASSESSMENT & PLAN NOTE
Taking pepcid and protonix  -should improve with weight loss, dietary, and lifestyle changes  -continue management with prescribing provider

## 2021-08-03 NOTE — ASSESSMENT & PLAN NOTE
Taking hctz, lisinopril  -should improve with weight loss, dietary, and lifestyle changes  -continue management with prescribing provider

## 2021-08-03 NOTE — PATIENT INSTRUCTIONS
Goals: Food log (ie ) www myfitnesspal com,sparkpeople  com,loseit com,calorieking  com,etc  baritastic-weigh and measure food   No sugary beverages  At least 64oz of water daily  Measure creamer and monkfruit   Increase physical activity by 10 minutes daily   Gradually increase physical activity to a goal of 5 days per week for 30 minutes of MODERATE intensity PLUS 2 days per week of FULL BODY resistance training-keep up the walking   8779-2211 calories   Follow the 30/60 minute rule

## 2021-08-03 NOTE — ASSESSMENT & PLAN NOTE
-Discussed options of HealthyCORE-Intensive Lifestyle Intervention Program, Very Low Calorie Diet-VLCD and Conservative Program and the role of weight loss medications   -Initial weight loss goal of 5-10% weight loss for improved health  -Screening labs  Recommend checking lab coverage before having labs drawn   -bmp and a1c reviewed from 6/18/21 all within acceptable limits  -Patient is interested in pursuing Conservative Program  -has a metformin allergy  -has no AOM medication coverage  -not a wellbutrin candidate de to prozac use  Phentermine has as potential side effects of increased heart rate, increased blood pressure, palpitations, headache, dizziness, insomnia, altered mood, abdominal upset, and dry mouth  Notify the provider with change in mood  ER with SI/HI  Phentermine should be stopped prior to surgery  Notify the provider with any changes in vision  while on phentermine check your resting blood pressure and pulse at least 3 times per week  For example you may do this Monday morning, Wednesday afternoon, and Friday night  Your blood pressure should not be 140/90 or higher and goal pulse goal  bpm (beats per minute ) Notify the provider if either are consistently elevated      Goals:  Food log (ie ) www myfitnesspal com,sparkpeople  com,loseit com,calorieking  com,etc  baritastic-weigh and measure food   No sugary beverages  At least 64oz of water daily  Measure creamer and monkfruit   Increase physical activity by 10 minutes daily   Gradually increase physical activity to a goal of 5 days per week for 30 minutes of MODERATE intensity PLUS 2 days per week of FULL BODY resistance training-keep up the walking   4579-6196 calories   Follow the 30/60 minute rule   once we receive ekg will call in phentermine

## 2021-08-03 NOTE — PROGRESS NOTES
Assessment/Plan:    Overweight  -Discussed options of HealthyCORE-Intensive Lifestyle Intervention Program, Very Low Calorie Diet-VLCD and Conservative Program and the role of weight loss medications   -Initial weight loss goal of 5-10% weight loss for improved health  -Screening labs  Recommend checking lab coverage before having labs drawn   -bmp and a1c reviewed from 6/18/21 all within acceptable limits  -Patient is interested in pursuing Conservative Program  -has a metformin allergy  -has no AOM medication coverage  -not a wellbutrin candidate de to prozac use  Phentermine has as potential side effects of increased heart rate, increased blood pressure, palpitations, headache, dizziness, insomnia, altered mood, abdominal upset, and dry mouth  Notify the provider with change in mood  ER with SI/HI  Phentermine should be stopped prior to surgery  Notify the provider with any changes in vision  while on phentermine check your resting blood pressure and pulse at least 3 times per week  For example you may do this Monday morning, Wednesday afternoon, and Friday night  Your blood pressure should not be 140/90 or higher and goal pulse goal  bpm (beats per minute ) Notify the provider if either are consistently elevated      Goals:  Food log (ie ) www myfitnesspal com,sparkpeople  com,loseit com,calorieking  com,etc  baritastic-weigh and measure food   No sugary beverages  At least 64oz of water daily  Measure creamer and monkfruit   Increase physical activity by 10 minutes daily   Gradually increase physical activity to a goal of 5 days per week for 30 minutes of MODERATE intensity PLUS 2 days per week of FULL BODY resistance training-keep up the walking   6843-1204 calories   Follow the 30/60 minute rule   once we receive ekg will call in phentermine      Status post laparoscopic sleeve gastrectomy  Sleeve, 2017, Dr Angela MONCADA   Pre surgery weight of 265 lbs    - lowest post of 136 lbs      Bipolar affective disorder ([facility] )  Taking wellbutrin, klonopin, prozac  -continue management with prescribing provider      Essential hypertension  Taking hctz, lisinopril  -should improve with weight loss, dietary, and lifestyle changes  -continue management with prescribing provider      Postsurgical malabsorption  Due for annual may 2022     GERD (gastroesophageal reflux disease)  Taking pepcid and protonix  -should improve with weight loss, dietary, and lifestyle changes  -continue management with prescribing provider          Follow up in approximately 2 months with Non-Surgical Physician/Advanced Practitioner  Diagnoses and all orders for this visit:    Overweight    Status post laparoscopic sleeve gastrectomy    Bipolar affective disorder ([facility] )    Essential hypertension    Postsurgical malabsorption    GERD (gastroesophageal reflux disease)    Other orders  -     Diclofenac Sodium (VOLTAREN) 1 %; Apply 1 g topically daily  -     Docusate Sodium (DSS) 100 MG CAPS; Take 100 mg by mouth  -     fluticasone (FLONASE) 50 mcg/act nasal spray; 2 sprays into each nostril daily          Subjective:   Chief Complaint   Patient presents with    Consult     MWM consult         Patient ID: Deon Mccarty  is a 62 y o  female with excess weight/obesity here to pursue weight managment  Patient is a post op weight regain patient  HPI  Patient notes since seeing Rd/Sw she has cut down on calories and carbs  Increased protein  Calorie counting and believes she is around 2000 calories-weighs and measures food  Walks 1 5 miles every night  6-7 water bottles per day, 2 cups of coffee with SF creamer and monkfruit  Denies other beverages or ETOH   Does not follow 30/60 minute rule      Colonoscopy-Completed 2 years ago     The following portions of the patient's history were reviewed and updated as appropriate: allergies, current medications, past family history, past medical history, past social history, past surgical history and problem list     Review of Systems   HENT: Negative for sore throat  Respiratory: Negative for cough and shortness of breath  Cardiovascular: Negative for chest pain and palpitations  Gastrointestinal: Negative for abdominal pain, constipation, diarrhea, nausea and vomiting         + GERD-taking medications    Skin: Negative for rash  Psychiatric/Behavioral: Negative for suicidal ideas (denies HI)  Denies depression and anxiety       Objective:    /74 (BP Location: Left arm, Patient Position: Sitting, Cuff Size: Adult)   Pulse 82   Temp 97 5 °F (36 4 °C) (Tympanic)   Resp 16   Ht 5' 3" (1 6 m)   Wt 72 7 kg (160 lb 4 8 oz)   BMI 28 40 kg/m²      Physical Exam  Vitals and nursing note reviewed  Constitutional   General appearance: Abnormal   well developed and overweight  Eyes No conjunctival pallor  Pulmonary   Respiratory effort: No increased work of breathing or signs of respiratory distress      Abdomen   Abdomen: Abnormal-overweight  Musculoskeletal   Gait and station: Normal     Psychiatric   Orientation to person, place and time: Normal     Affect: appropriate

## 2021-08-04 ENCOUNTER — TELEPHONE (OUTPATIENT)
Dept: BARIATRICS | Facility: CLINIC | Age: 61
End: 2021-08-04

## 2021-08-04 NOTE — TELEPHONE ENCOUNTER
Pt called inquiring about medication that was to be sent to her pharmacy   Pt also wanted to know if you saw results of her ekg from [facility] done 6/26/2021

## 2021-08-05 NOTE — TELEPHONE ENCOUNTER
Discussed with patient ekg not acceptable for phentermine  Could start topamax with approval from psych  Patient sees [facility] - Dr Ashley Lyles  Will call and discuss addition of topamax

## 2021-08-16 NOTE — TELEPHONE ENCOUNTER
Per message from [address] [place]  Pt did talk to her psychiatrist yesterday and he is okay with the Topamax and you can call and verify with him  Dr Kalli Kinney [address] [place] [address]

## 2021-08-24 NOTE — TELEPHONE ENCOUNTER
Ash from Dr Ora Gordon returned call to inquire dosing of topamax  Notes she will talk with Dr Ora Gordon and get back to me

## 2021-08-26 NOTE — TELEPHONE ENCOUNTER
Per telephone note from [facility]  Panchito Jarrett called back from [place] per Dr Lino it is okay to prescribe topamax 25mg, any questions  phone# [phone]

## 2021-09-15 ENCOUNTER — OFFICE VISIT (OUTPATIENT)
Dept: BARIATRICS | Facility: CLINIC | Age: 61
End: 2021-09-15
Payer: COMMERCIAL

## 2021-09-15 VITALS
WEIGHT: 154 LBS | DIASTOLIC BLOOD PRESSURE: 62 MMHG | TEMPERATURE: 97.6 F | BODY MASS INDEX: 27.29 KG/M2 | RESPIRATION RATE: 16 BRPM | HEART RATE: 61 BPM | HEIGHT: 63 IN | SYSTOLIC BLOOD PRESSURE: 110 MMHG

## 2021-09-15 DIAGNOSIS — K31.84 GASTROPARESIS DUE TO DM (HCC): ICD-10-CM

## 2021-09-15 DIAGNOSIS — K91.2 POSTSURGICAL MALABSORPTION: ICD-10-CM

## 2021-09-15 DIAGNOSIS — I10 ESSENTIAL HYPERTENSION: ICD-10-CM

## 2021-09-15 DIAGNOSIS — E11.43 GASTROPARESIS DUE TO DM (HCC): ICD-10-CM

## 2021-09-15 DIAGNOSIS — E66.3 OVERWEIGHT: Primary | ICD-10-CM

## 2021-09-15 DIAGNOSIS — K21.9 GERD (GASTROESOPHAGEAL REFLUX DISEASE): ICD-10-CM

## 2021-09-15 DIAGNOSIS — Z98.84 STATUS POST LAPAROSCOPIC SLEEVE GASTRECTOMY: ICD-10-CM

## 2021-09-15 DIAGNOSIS — J44.9 CHRONIC OBSTRUCTIVE PULMONARY DISEASE, UNSPECIFIED COPD TYPE (HCC): ICD-10-CM

## 2021-09-15 DIAGNOSIS — F31.9 BIPOLAR AFFECTIVE DISORDER (HCC): ICD-10-CM

## 2021-09-15 PROCEDURE — 99214 OFFICE O/P EST MOD 30 MIN: CPT | Performed by: PHYSICIAN ASSISTANT

## 2021-09-15 RX ORDER — TOPIRAMATE 50 MG/1
50 TABLET, FILM COATED ORAL 2 TIMES DAILY
Qty: 60 TABLET | Refills: 1 | Status: SHIPPED | OUTPATIENT
Start: 2021-09-15 | End: 2021-10-25 | Stop reason: SDUPTHER

## 2021-09-15 RX ORDER — TOPIRAMATE 25 MG/1
25 TABLET ORAL 2 TIMES DAILY
COMMUNITY
Start: 2021-08-12 | End: 2021-09-15 | Stop reason: DRUGHIGH

## 2021-09-15 RX ORDER — IPRATROPIUM BROMIDE 42 UG/1
1-2 SPRAY, METERED NASAL 4 TIMES DAILY PRN
COMMUNITY
Start: 2021-08-25

## 2021-09-15 RX ORDER — FLUTICASONE FUROATE AND VILANTEROL TRIFENATATE 100; 25 UG/1; UG/1
1 POWDER RESPIRATORY (INHALATION) AS NEEDED
COMMUNITY
Start: 2021-08-23

## 2021-09-15 NOTE — ASSESSMENT & PLAN NOTE
-Patient is pursuing Conservative Program  -Initial weight loss goal of 5-10% weight loss for improved health  -has a metformin allergy  -has no AOM medication coverage  -not a wellbutrin candidate de to prozac use    Initial:160 3 lbs   Current:154 lbs   Change:-6 3 lbs     Goals:  Food log (ie ) www myfitnesspal com,sparkpeople  com,loseit com,calorieking  com,etc  baritastic-weigh and measure food   No sugary beverages  At least 64oz of water daily  Measure creamer and monkfruit   Increase physical activity by 10 minutes daily   Gradually increase physical activity to a goal of 5 days per week for 30 minutes of MODERATE intensity PLUS 2 days per week of FULL BODY resistance training-keep up the walking   7881-3117 calories   Follow the 30/60 minute rule   Increase topamax 50 mg BID   Can add pecans to salad   Weigh yourself only once per week

## 2021-09-15 NOTE — PROGRESS NOTES
Assessment/Plan:    Overweight  -Patient is pursuing Conservative Program  -Initial weight loss goal of 5-10% weight loss for improved health  -has a metformin allergy  -has no AOM medication coverage  -not a wellbutrin candidate de to prozac use    Initial:160 3 lbs   Current:154 lbs   Change:-6 3 lbs     Goals:  Food log (ie ) www myfitnesspal com,sparkpeople  com,loseit com,calorieking  com,etc  baritastic-weigh and measure food   No sugary beverages  At least 64oz of water daily  Measure creamer and monkfruit   Increase physical activity by 10 minutes daily  Gradually increase physical activity to a goal of 5 days per week for 30 minutes of MODERATE intensity PLUS 2 days per week of FULL BODY resistance training-keep up the walking   1638-0883 calories   Follow the 30/60 minute rule   Increase topamax 50 mg BID   Can add pecans to salad   Weigh yourself only once per week     Status post laparoscopic sleeve gastrectomy  Sleeve, 2017, Dr Melvin MONCADA   Pre surgery weight of 265 lbs    - lowest post of 136 lbs      Postsurgical malabsorption  Due for annual may 2022     GERD (gastroesophageal reflux disease)  Taking pepcid and protonix  -should improve with weight loss, dietary, and lifestyle changes  -continue management with prescribing provider      Essential hypertension  Taking hctz, lisinopril  -should improve with weight loss, dietary, and lifestyle changes  -continue management with prescribing provider      Bipolar affective disorder ([facility] )  Taking wellbutrin, klonopin, prozac  -continue management with prescribing provider          Follow up in approximately 2 months with Non-Surgical Physician/Advanced Practitioner  Diagnoses and all orders for this visit:    Overweight  -     topiramate (TOPAMAX) 50 MG tablet; Take 1 tablet (50 mg total) by mouth 2 (two) times a day    Status post laparoscopic sleeve gastrectomy  -     topiramate (TOPAMAX) 50 MG tablet;  Take 1 tablet (50 mg total) by mouth 2 (two) times a day    Postsurgical malabsorption  -     topiramate (TOPAMAX) 50 MG tablet; Take 1 tablet (50 mg total) by mouth 2 (two) times a day    GERD (gastroesophageal reflux disease)  -     topiramate (TOPAMAX) 50 MG tablet; Take 1 tablet (50 mg total) by mouth 2 (two) times a day    Essential hypertension  -     topiramate (TOPAMAX) 50 MG tablet; Take 1 tablet (50 mg total) by mouth 2 (two) times a day    Bipolar affective disorder (HCC)  -     topiramate (TOPAMAX) 50 MG tablet; Take 1 tablet (50 mg total) by mouth 2 (two) times a day    Gastroparesis due to DM ([facility])    Chronic obstructive pulmonary disease, unspecified COPD type ([facility] )    Other orders  -     Breo Ellipta 100-25 MCG/INH inhaler; Inhale 1 puff as needed  -     ipratropium (ATROVENT) 0 06 % nasal spray; 1-2 sprays into each nostril 4 (four) times a day as needed  -     Discontinue: topiramate (TOPAMAX) 25 mg tablet; Take 25 mg by mouth 2 (two) times a day          Subjective:   Chief Complaint   Patient presents with    Follow-up     6 week post op weight gain follow up         Patient ID: Silvina Lombardi  is a 62 y o  female with excess weight/obesity here to pursue weight managment  Patient is pursuing Conservative Program      HPI    Food logging:logging and staying between 3687-6735 calories  Weighing and measuring food   Fruit/Vegetable servings: 2 fruit and 2 veggies   Exercise: walks every evening for 60-90 minutes for about 4 miles   Hydration: 6-7 water bottles per day, 2 cups of coffee with SF creamer and monkfruit  Denies other beverages or ETOH  Following the 30/60 minute rule   Topamax: taking 25 mg BID  Notes it helped her not think about food all time and make healthier choices  Patient denies any negative side effects       B: 1/2 cup of oatmeal with strawberries or turkey sausage and fruit   S: N/a  L: protein shake or protein bar and celery or broccoli   S: brownie fiber one  D: salad with grilled chicken and fresh strawberries and pineapple and light italian dressing   S: SF pudding     Colonoscopy-Completed 2 years ago     The following portions of the patient's history were reviewed and updated as appropriate: allergies, current medications, past family history, past medical history, past social history, past surgical history and problem list     Review of Systems   HENT: Negative for sore throat  Respiratory: Negative for cough and shortness of breath  Cardiovascular: Negative for chest pain and palpitations  Gastrointestinal: Negative for abdominal pain, constipation, diarrhea, nausea and vomiting  Denies GERD   Skin: Negative for rash  Psychiatric/Behavioral: Negative for suicidal ideas (denies HI)  Denies depression and anxiety       Objective:    /62 (BP Location: Left arm, Patient Position: Sitting, Cuff Size: Adult)   Pulse 61   Temp 97 6 °F (36 4 °C) (Tympanic)   Resp 16   Ht 5' 3" (1 6 m)   Wt 69 9 kg (154 lb)   BMI 27 28 kg/m²      Physical Exam  Vitals and nursing note reviewed  Constitutional   General appearance: Abnormal   well developed and overweight  Eyes No conjunctival pallor  Pulmonary   Respiratory effort: No increased work of breathing or signs of respiratory distress      Abdomen   Abdomen: Abnormal -overweight   Musculoskeletal   Gait and station: Normal     Psychiatric   Orientation to person, place and time: Normal     Affect: appropriate

## 2021-09-21 NOTE — ASSESSMENT & PLAN NOTE
[place], 2017, [facility], Dr Tom Trevizo   Pre surgery weight of 265 lbs    - lowest post of 136 lbs n/a

## 2021-10-25 ENCOUNTER — OFFICE VISIT (OUTPATIENT)
Dept: BARIATRICS | Facility: CLINIC | Age: 61
End: 2021-10-25
Payer: COMMERCIAL

## 2021-10-25 VITALS
DIASTOLIC BLOOD PRESSURE: 72 MMHG | WEIGHT: 150.1 LBS | HEIGHT: 63 IN | BODY MASS INDEX: 26.59 KG/M2 | SYSTOLIC BLOOD PRESSURE: 118 MMHG | TEMPERATURE: 97.4 F | HEART RATE: 57 BPM

## 2021-10-25 DIAGNOSIS — N62 MACROMASTIA: ICD-10-CM

## 2021-10-25 DIAGNOSIS — I10 ESSENTIAL HYPERTENSION: ICD-10-CM

## 2021-10-25 DIAGNOSIS — K91.2 POSTSURGICAL MALABSORPTION: ICD-10-CM

## 2021-10-25 DIAGNOSIS — K21.9 GERD (GASTROESOPHAGEAL REFLUX DISEASE): ICD-10-CM

## 2021-10-25 DIAGNOSIS — E66.3 OVERWEIGHT: Primary | ICD-10-CM

## 2021-10-25 DIAGNOSIS — Z98.84 STATUS POST LAPAROSCOPIC SLEEVE GASTRECTOMY: ICD-10-CM

## 2021-10-25 DIAGNOSIS — F31.9 BIPOLAR AFFECTIVE DISORDER (HCC): ICD-10-CM

## 2021-10-25 PROCEDURE — 99214 OFFICE O/P EST MOD 30 MIN: CPT | Performed by: PHYSICIAN ASSISTANT

## 2021-10-25 RX ORDER — EPINEPHRINE 0.3 MG/.3ML
INJECTION SUBCUTANEOUS
COMMUNITY
Start: 2021-08-25

## 2021-10-25 RX ORDER — MELOXICAM 15 MG/1
15 TABLET ORAL DAILY
COMMUNITY
Start: 2021-10-14 | End: 2022-06-21

## 2021-10-25 RX ORDER — TOPIRAMATE 50 MG/1
TABLET, FILM COATED ORAL
Qty: 90 TABLET | Refills: 1 | Status: SHIPPED | OUTPATIENT
Start: 2021-10-25 | End: 2021-12-20 | Stop reason: DRUGHIGH

## 2021-11-03 ENCOUNTER — TELEPHONE (OUTPATIENT)
Dept: PLASTIC SURGERY | Facility: HOSPITAL | Age: 61
End: 2021-11-03

## 2021-12-20 ENCOUNTER — OFFICE VISIT (OUTPATIENT)
Dept: BARIATRICS | Facility: CLINIC | Age: 61
End: 2021-12-20
Payer: COMMERCIAL

## 2021-12-20 VITALS
SYSTOLIC BLOOD PRESSURE: 120 MMHG | HEART RATE: 77 BPM | WEIGHT: 146.7 LBS | TEMPERATURE: 97.2 F | BODY MASS INDEX: 25.99 KG/M2 | HEIGHT: 63 IN | DIASTOLIC BLOOD PRESSURE: 70 MMHG

## 2021-12-20 DIAGNOSIS — I10 ESSENTIAL HYPERTENSION: ICD-10-CM

## 2021-12-20 DIAGNOSIS — F31.9 BIPOLAR AFFECTIVE DISORDER (HCC): ICD-10-CM

## 2021-12-20 DIAGNOSIS — E66.3 OVERWEIGHT: Primary | ICD-10-CM

## 2021-12-20 DIAGNOSIS — K91.2 POSTSURGICAL MALABSORPTION: ICD-10-CM

## 2021-12-20 DIAGNOSIS — Z98.84 STATUS POST LAPAROSCOPIC SLEEVE GASTRECTOMY: ICD-10-CM

## 2021-12-20 DIAGNOSIS — K21.9 GERD (GASTROESOPHAGEAL REFLUX DISEASE): ICD-10-CM

## 2021-12-20 PROCEDURE — 99214 OFFICE O/P EST MOD 30 MIN: CPT | Performed by: PHYSICIAN ASSISTANT

## 2021-12-20 RX ORDER — TOPIRAMATE 100 MG/1
100 TABLET, FILM COATED ORAL 2 TIMES DAILY
Qty: 60 TABLET | Refills: 1 | Status: SHIPPED | OUTPATIENT
Start: 2021-12-20 | End: 2022-02-18 | Stop reason: SDUPTHER

## 2021-12-20 RX ORDER — HYDROCODONE BITARTRATE AND ACETAMINOPHEN 5; 325 MG/1; MG/1
1-2 TABLET ORAL
COMMUNITY
Start: 2021-12-16 | End: 2022-06-21

## 2022-02-18 DIAGNOSIS — F31.9 BIPOLAR AFFECTIVE DISORDER (HCC): ICD-10-CM

## 2022-02-18 DIAGNOSIS — Z98.84 STATUS POST LAPAROSCOPIC SLEEVE GASTRECTOMY: ICD-10-CM

## 2022-02-18 DIAGNOSIS — K91.2 POSTSURGICAL MALABSORPTION: ICD-10-CM

## 2022-02-18 DIAGNOSIS — E66.3 OVERWEIGHT: ICD-10-CM

## 2022-02-18 DIAGNOSIS — K21.9 GERD (GASTROESOPHAGEAL REFLUX DISEASE): ICD-10-CM

## 2022-02-18 DIAGNOSIS — I10 ESSENTIAL HYPERTENSION: ICD-10-CM

## 2022-02-18 RX ORDER — TOPIRAMATE 100 MG/1
100 TABLET, FILM COATED ORAL 2 TIMES DAILY
Qty: 60 TABLET | Refills: 1 | Status: SHIPPED | OUTPATIENT
Start: 2022-02-18 | End: 2022-03-21 | Stop reason: SDUPTHER

## 2022-03-21 ENCOUNTER — OFFICE VISIT (OUTPATIENT)
Dept: BARIATRICS | Facility: CLINIC | Age: 62
End: 2022-03-21
Payer: MEDICARE

## 2022-03-21 VITALS
WEIGHT: 140.7 LBS | BODY MASS INDEX: 24.93 KG/M2 | SYSTOLIC BLOOD PRESSURE: 116 MMHG | TEMPERATURE: 97.5 F | RESPIRATION RATE: 14 BRPM | DIASTOLIC BLOOD PRESSURE: 70 MMHG | HEIGHT: 63 IN | HEART RATE: 61 BPM

## 2022-03-21 DIAGNOSIS — E66.3 OVERWEIGHT: ICD-10-CM

## 2022-03-21 DIAGNOSIS — Z98.84 STATUS POST LAPAROSCOPIC SLEEVE GASTRECTOMY: ICD-10-CM

## 2022-03-21 DIAGNOSIS — I10 ESSENTIAL HYPERTENSION: ICD-10-CM

## 2022-03-21 DIAGNOSIS — K21.9 GERD (GASTROESOPHAGEAL REFLUX DISEASE): ICD-10-CM

## 2022-03-21 DIAGNOSIS — F31.9 BIPOLAR AFFECTIVE DISORDER (HCC): ICD-10-CM

## 2022-03-21 DIAGNOSIS — K91.2 POSTSURGICAL MALABSORPTION: ICD-10-CM

## 2022-03-21 PROCEDURE — 99214 OFFICE O/P EST MOD 30 MIN: CPT | Performed by: PHYSICIAN ASSISTANT

## 2022-03-21 RX ORDER — CLONAZEPAM 0.5 MG/1
TABLET ORAL
COMMUNITY
Start: 2022-03-16

## 2022-03-21 RX ORDER — BUPROPION HYDROCHLORIDE 300 MG/1
TABLET ORAL
COMMUNITY
Start: 2022-03-16

## 2022-03-21 RX ORDER — PRAZOSIN HYDROCHLORIDE 5 MG/1
CAPSULE ORAL
COMMUNITY
Start: 2022-03-16

## 2022-03-21 RX ORDER — FLUOXETINE HYDROCHLORIDE 20 MG/1
CAPSULE ORAL
COMMUNITY
Start: 2022-03-16

## 2022-03-21 RX ORDER — TOPIRAMATE 100 MG/1
100 TABLET, FILM COATED ORAL 2 TIMES DAILY
Qty: 60 TABLET | Refills: 1 | Status: SHIPPED | OUTPATIENT
Start: 2022-03-21 | End: 2022-06-21

## 2022-03-21 NOTE — ASSESSMENT & PLAN NOTE
-Patient is pursuing Conservative Program  -Initial weight loss goal of 5-10% weight loss for improved health  -has a metformin allergy  -has no AOM medication coverage  -already taking wellbutrin   -topamax started 8/12/21 at 159 lbs-started by pcp  Side effects reviewed with patient  Patient notes to me that she has two small kidney stones that are able to pass on there own  She notes she has had kidney stones her whole life  We discussed in detail that topamax can increase risk of kidney stones  Patient wants to continue medication noting this is something she has dealt with even prior to topamax  Would consider d/c topamax if kidney stones become operable  Initial:160 3 lbs   Current:140 7 lbs   Change:-19 6 lbs   GOAL: 135-141 lbs     Goals:  Food log (ie ) www myfitnesspal com,sparkpeople  com,loseit com,calorieking  com,etc  baritastic-weigh and measure food   No sugary beverages  At least 64oz of water daily  Measure creamer and monkfruit   Increase physical activity by 10 minutes daily   Gradually increase physical activity to a goal of 5 days per week for 30 minutes of MODERATE intensity PLUS 2 days per week of FULL BODY resistance training-keep up the walking   4717-8200 calories   Follow the 30/60 minute rule   Continue Topamax 100 mg BID  Can add pecans to salad   Weigh yourself only once per week    KEEP UP THE GREAT WORK

## 2022-03-21 NOTE — PROGRESS NOTES
Assessment/Plan:    BMI 24 0-24 9, adult  -Patient is pursuing Conservative Program  -Initial weight loss goal of 5-10% weight loss for improved health  -has a metformin allergy  -has no AOM medication coverage  -already taking wellbutrin   -topamax started 8/12/21 at 159 lbs-started by pcp  Side effects reviewed with patient  Patient notes to me that she has two small kidney stones that are able to pass on there own  She notes she has had kidney stones her whole life  We discussed in detail that topamax can increase risk of kidney stones  Patient wants to continue medication noting this is something she has dealt with even prior to topamax  Would consider d/c topamax if kidney stones become operable  Initial:160 3 lbs   Current:140 7 lbs   Change:-19 6 lbs   GOAL: 135-141 lbs     Goals:  Food log (ie ) www myfitnesspal com,sparkpeople  com,loseit com,calorieking  com,etc  baritastic-weigh and measure food   No sugary beverages  At least 64oz of water daily  Measure creamer and monkfruit   Increase physical activity by 10 minutes daily   Gradually increase physical activity to a goal of 5 days per week for 30 minutes of MODERATE intensity PLUS 2 days per week of FULL BODY resistance training-keep up the walking   [id number] calories   Follow the 30/60 minute rule   Continue Topamax 100 mg BID  Can add pecans to salad   Weigh yourself only once per week    KEEP UP THE GREAT WORK        Status post laparoscopic sleeve gastrectomy  Sleeve, 2017, [facility], Dr Cris Maria   Pre surgery weight of 265 lbs    - lowest post of 136 lbs      Bipolar affective disorder ([facility] [address] )  Taking wellbutrin, klonopin, prozac, latuda   -continue management with prescribing provider      Essential hypertension  No longer taking meds for BP       GERD (gastroesophageal reflux disease)  Taking pepcid and protonix  -should improve with weight loss, dietary, and lifestyle changes  -continue management with prescribing provider      Postsurgical malabsorption  Due for annual may 2022         Follow up in approximately 3 months with Non-Surgical Physician/Advanced Practitioner  Diagnoses and all orders for this visit:    BMI 24 0-24 9, adult    Status post laparoscopic sleeve gastrectomy  -     topiramate (Topamax) 100 mg tablet; Take 1 tablet (100 mg total) by mouth 2 (two) times a day    Bipolar affective disorder (HCC)  -     topiramate (Topamax) 100 mg tablet; Take 1 tablet (100 mg total) by mouth 2 (two) times a day    Essential hypertension  -     topiramate (Topamax) 100 mg tablet; Take 1 tablet (100 mg total) by mouth 2 (two) times a day    GERD (gastroesophageal reflux disease)  -     topiramate (Topamax) 100 mg tablet; Take 1 tablet (100 mg total) by mouth 2 (two) times a day    Postsurgical malabsorption  -     topiramate (Topamax) 100 mg tablet; Take 1 tablet (100 mg total) by mouth 2 (two) times a day    Overweight  -     topiramate (Topamax) 100 mg tablet; Take 1 tablet (100 mg total) by mouth 2 (two) times a day    Other orders  -     buPROPion (WELLBUTRIN XL) 300 mg 24 hr tablet  -     clonazePAM (KlonoPIN) 0 5 mg tablet  -     FLUoxetine (PROzac) 20 mg capsule  -     prazosin (MINIPRESS) 5 mg capsule          Subjective:   Chief Complaint   Patient presents with    Post-op     3 month post-op Wt Gain/fup        Patient ID: Shanta Agustin  is a 62 y o  female with excess weight/obesity here to pursue weight managment  Patient is pursuing Conservative Program      HPI    Food logging: not food logging   Exercise: riding bike 4 days a week for 45 minutes   Stretching and light weight lifting   Hydration:8 bottles of water, 1 cup of coffee with monkfruit and 1 bottle of G0  Topamax 100 mg BID- helping appetite- denies any negative side effects     Saw ENT who said from scope they did she is having a lot of acid reflux-recommended she get egd- will schedule f/u with Dr Savita Rivera     B: 1/2 cup of oatmeal with strawberries or turkey sausage and fruit or fruit and hard boiled egg   S: N/a  L: greek yogurt with mixed berries   S: protein bar-natural valley  D: salad with grilled chicken and fresh strawberries and pineapple and light Carlos dressing or chicken and sweet potato and salad   S: SF pudding or 1/2 protein bar and strawberries or applesauce     Patient notes to me that she has two small kidney stones that are able to pass on there own  She notes she has had kidney stones her whole life  We discussed in detail that topamax can increase risk of kidney stones  Patient wants to continue medication noting this is something she has dealt with even prior to topamax  Would consider d/c topamax if kidney stones become operable  The following portions of the patient's history were reviewed and updated as appropriate: allergies, current medications, past family history, past medical history, past social history, past surgical history and problem list     Review of Systems   HENT: Negative for sore throat  Respiratory: Negative for cough and shortness of breath  Cardiovascular: Negative for chest pain and palpitations  Gastrointestinal: Negative for abdominal pain, constipation, diarrhea, nausea and vomiting         + GERD-taking meds    Skin: Negative for rash  Psychiatric/Behavioral: Negative for suicidal ideas (denies HI)  Denies depression and anxiety       Objective:    /70   Pulse 61   Temp 97 5 °F (36 4 °C) (Tympanic)   Resp 14   Ht 5' 3" (1 6 m)   Wt 63 8 kg (140 lb 11 2 oz)   BMI 24 92 kg/m²      Physical Exam  Vitals and nursing note reviewed  Constitutional   General appearance: Abnormal   well developed and normal weight  Eyes No conjunctival pallor  Pulmonary   Respiratory effort: No increased work of breathing or signs of respiratory distress  Abdomen   Abdomen: Abnormal-overweight  Musculoskeletal   Gait and station: Normal     Skin  Dry   No visible rashes   Psychiatric   Orientation to person, place and time: Normal     Affect: appropriate  Neurological   Normal gait

## 2022-03-28 ENCOUNTER — OFFICE VISIT (OUTPATIENT)
Dept: BARIATRICS | Facility: CLINIC | Age: 62
End: 2022-03-28
Payer: MEDICARE

## 2022-03-28 VITALS
SYSTOLIC BLOOD PRESSURE: 122 MMHG | HEIGHT: 63 IN | WEIGHT: 139.5 LBS | BODY MASS INDEX: 24.72 KG/M2 | HEART RATE: 55 BPM | DIASTOLIC BLOOD PRESSURE: 70 MMHG | TEMPERATURE: 96.9 F

## 2022-03-28 DIAGNOSIS — R53.83 FATIGUE: ICD-10-CM

## 2022-03-28 DIAGNOSIS — Z48.815 ENCOUNTER FOR SURGICAL AFTERCARE FOLLOWING SURGERY OF DIGESTIVE SYSTEM: Primary | ICD-10-CM

## 2022-03-28 DIAGNOSIS — K91.2 POSTSURGICAL MALABSORPTION: ICD-10-CM

## 2022-03-28 DIAGNOSIS — K21.9 GERD (GASTROESOPHAGEAL REFLUX DISEASE): ICD-10-CM

## 2022-03-28 DIAGNOSIS — Z98.84 BARIATRIC SURGERY STATUS: ICD-10-CM

## 2022-03-28 PROCEDURE — 99214 OFFICE O/P EST MOD 30 MIN: CPT | Performed by: NURSE PRACTITIONER

## 2022-03-28 RX ORDER — PANTOPRAZOLE SODIUM 40 MG/1
40 TABLET, DELAYED RELEASE ORAL 2 TIMES DAILY
Qty: 60 TABLET | Refills: 3 | Status: SHIPPED | OUTPATIENT
Start: 2022-03-28 | End: 2022-07-14

## 2022-03-28 NOTE — PROGRESS NOTES
ADDENDUM -   Called patient to discuss - UGI review with Dr Dain Schuler on the next steps  Will leave patient on schedule for potential EGD with [facility]  Today, patient is complaining of midepigastric pain that worsened since last seen however reflux appears to be better with increase of omeprazole to twice daily  Will add carafate for now  Advised to crush and mix with water to take as a suspension 4x/day  UGI  04/12/2022 -    FINDINGS:     The esophagus is normal in caliber  Esophageal motility is normal and emptying of contrast from the esophagus is prompt  There is no mucosal mass, ulceration or fold thickening identified      Expected appearance of the stomach status post sleeve gastrectomy is noted  No ulceration or mass is identified      Contrast empties promptly into the duodenum  The duodenum is normal in caliber  The ligament of Treitz/duodenojejunal junction lies in a normal position      Gastroesophageal reflux was not observed        Small transient sliding-type hiatal hernia was noted         IMPRESSION:     Small transient sliding-type hiatal hernia, otherwise expected appearance status post gastric sleeve            Assessment/Plan:     Patient ID: Deneen Love is a 62 y o  female  Bariatric Surgery Status  - s/p Vertical Sleeve Gastrectomy with Dr Dion Feliciano at [facility]  In 2018  Presents to the office today for annual and c/o of reflux  States she has seen an ENT due to her hoarse voice and throat irritation and was told she had severe acid reflux  She was refer to GI to assist with acid reflux but instead came here  Per chart review, patient was seen to establish care approximately a year ago  She had an UGI in may 2021 -   "IMPRESSION:     1  Small sliding hiatal hernia    2   Postsurgical changes of sleeve gastrectomy  "    She also had an EGD on 03/19/2021 by Dr Miroslava Ley and was shown to have:   "IMPRESSION:  Dilated fundus        RECOMMENDATION:  She may benefit from conversion to [place]-en-Y gastric bypass if her reflux symptoms persist   Would refer to bariatric surgery if patient agrees "      She was seen by Dr Savita Rivera and at that time, it was seem best to undergo medical weight management to help with weight lost and would help with GERD symptoms  She has lost weight since MW program; reached near her goal weight of 135 LBS  Tolerating her weight lost medications well without any side effects  Denies use of NSAIDs, steroids, smoking, ETOH  Takes about 3 oz of coffee per day in the morning  Reflux occurs on a daily basis  She describes it as a heavy, dull sensation of the  Epigastric region  Takes pantoprazole once daily and pepcid once a day  States she had always had reflux in the past even before she had her gastric sleeve  Has seen GI in the past but was not for reflux  She cannot recall what her GI visit was for  Does report being on pantoprazole for a very long time  PLAN:   - Increase pantoprazole to twice a day  New script sent to pharmacy for 40 mg PO BID  - Advised patient to take pepcid as needed if only needed  - UGI ordered - call central scheduling to get done  - Will have patient get EGD with [facility] for potential conversion to bypass  - Discussed with patient to be off all antacids for 7 days prior to procedure  Limit foods and drinks that may contribute to her reflux  Denies nickel sensitivity  - Advised patient to follow up with her PCP as she may need to get a DEXA scan due to long term use of PPI    - Routine follow up in 1 year for annual  Follow up with surgeon after UGI and EGD done  - Continue with healthy lifestyle, adequate protein intake of 60 gm, fluid intake of at least 64 oz  - Continue with MVI daily  - Activity as tolerated  - Follow up with RD and SW as needed  - ER precautions provided and reviewed with patient  HTN - off medications  Continue to monitor for now  Follow up with PCP as needed/scheduled  Gastroparesis with DM - patient is now off medications for Type II DM  Last A1c on 10/07/2021 - 5  2  continue to monitor for now and continue with PPI  · Continued/Maintain healthy weight loss with good nutrition intakes  · Adequate hydration with at least 64oz  fluid intake  · Follow diet as discussed  · Follow vitamin and mineral recommendations as reviewed with you  · Exercise as tolerated  · Colonoscopy referral made: utd  · Mammogram - UTD    · Follow-up in 1 year for annual and with surgeon after UGI and EGD  We kindly ask that your arrive 15 minutes before your scheduled appointment time with your provider to allow our staff to room you, get your vital signs and update your chart  · Get lab work done  Please call the office if you need a script  It is recommended to check with your insurance BEFORE getting labs done to make sure they are covered by your policy  · Call our office if you have any problems with abdominal pain especially associated with fever, chills, nausea, vomiting or any other concerns  · All  Post-bariatric surgery patients should be aware that very small quantities of any alcohol can cause impairment and it is very possible not to feel the effect  The effect can be in the system for several hours  It is also a stomach irritant  · It is advised to AVOID alcohol, Nonsteroidal antiinflammatory drugs (NSAIDS) and nicotine of all forms   Any of these can cause stomach irritation/pain  · Discussed the effects of alcohol on a bariatric patient and the increased impairment risk  · Keep up the good work!      Postsurgical Malabsorption   -At risk for malabsorption of vitamins/minerals secondary to malabsorption and restriction of intake from bariatric surgery  - Currently taking adequate postop bariatric surgery vitamin supplementation  -Last set of bariatric labs completed on 10/2021 and showed WNL  -Patient received education about the importance of adhering to a lifelong supplementation regimen to avoid vitamin/mineral deficiencies      Diagnoses and all orders for this visit:    Encounter for surgical aftercare following surgery of digestive system  -     FL UPPER GI UGI; Future  -     pantoprazole (PROTONIX) 40 mg tablet; Take 1 tablet (40 mg total) by mouth 2 (two) times a day    Bariatric surgery status  -     FL UPPER GI UGI; Future  -     pantoprazole (PROTONIX) 40 mg tablet; Take 1 tablet (40 mg total) by mouth 2 (two) times a day    Postsurgical malabsorption  -     FL UPPER GI UGI; Future  -     pantoprazole (PROTONIX) 40 mg tablet; Take 1 tablet (40 mg total) by mouth 2 (two) times a day    BMI 24 0-24 9, adult  -     FL UPPER GI UGI; Future  -     pantoprazole (PROTONIX) 40 mg tablet; Take 1 tablet (40 mg total) by mouth 2 (two) times a day    GERD (gastroesophageal reflux disease)  -     FL UPPER GI UGI; Future  -     pantoprazole (PROTONIX) 40 mg tablet; Take 1 tablet (40 mg total) by mouth 2 (two) times a day    Fatigue         Subjective:      Patient ID: Indu Gamble is a 62 y o  female  - s/p Vertical Sleeve Gastrectomy with Dr Thea Duran at [facility]  In 2018  Presents to the office today for annual and c/o of reflux  States she has seen an ENT due to her hoarse voice and throat irritation and was told she had severe acid reflux  She was refer to GI to assist with acid reflux but instead came here  Denies use of NSAIDs, steroids, smoking, ETOH  Takes about 3 oz of coffee per day in the morning  Reflux occurs on a daily basis  She describes it as a heavy, dull sensation of the  Epigastric region  Takes pantoprazole once daily and pepcid once a day  States she had always had reflux in the past even before she had her gastric sleeve  Has seen GI in the past but was not for reflux  She cannot recall what her GI visit was for  Does report being on pantoprazole for a very long time       Initial: 265 LBS  Current: 140 LBS  EWL: (Weight loss is ahead of schedule at this post surgical period )  [place]: 136 LBS  Current BMI is Body mass index is 24 71 kg/m²  · Tolerating a regular diet-yes  · Eating at least 60 grams of protein per day-yes  · Following 30/60 minute rule with liquids-yes  · Drinking at least 64 ounces of fluid per day-yes  · Drinking carbonated beverages-no  · Sufficient exercise-yes - ride her bike and muscle strength  · Using NSAIDs regularly-no  · Using nicotine-no  · Using alcohol-no  · Supplements: cm MVI + B12 + calcium     · EWL is 111%, which places the patient ahead of schedule for expected post surgical weight loss at this time  The following portions of the patient's history were reviewed and updated as appropriate: allergies, current medications, past family history, past medical history, past social history, past surgical history and problem list     Review of Systems   Constitutional: Positive for fatigue  Respiratory: Positive for shortness of breath (at baseline  hx of COPD)  Cardiovascular: Negative  Gastrointestinal: Positive for abdominal pain  Negative for constipation, nausea and vomiting  Mid-epigastric - dull achy pain; reflux  Musculoskeletal: Negative  Skin: Negative  Neurological: Negative  Psychiatric/Behavioral: Negative  Objective:    /70 (BP Location: Left arm, Patient Position: Sitting, Cuff Size: Standard)   Pulse 55   Temp (!) 96 9 °F (36 1 °C) (Tympanic)   Ht 5' 3" (1 6 m)   Wt 63 3 kg (139 lb 8 oz)   BMI 24 71 kg/m²      Physical Exam  Vitals and nursing note reviewed  Constitutional:       Appearance: Normal appearance  She is normal weight  HENT:      Mouth/Throat:      Comments: Hoarseness  Cardiovascular:      Rate and Rhythm: Normal rate and regular rhythm  Pulses: Normal pulses  Heart sounds: Normal heart sounds  Pulmonary:      Effort: Pulmonary effort is normal       Breath sounds: Normal breath sounds     Abdominal:      General: Bowel sounds are normal  There is no distension  Palpations: Abdomen is soft  Tenderness: There is abdominal tenderness (of midepigastric region)  There is no guarding or rebound  Hernia: A hernia (LEFT ABDOMINAL WALL HERNIA OF LATERAL ABDOMEN) is present  Musculoskeletal:         General: Normal range of motion  Skin:     General: Skin is warm and dry  Neurological:      General: No focal deficit present  Mental Status: She is alert and oriented to person, place, and time  Psychiatric:         Mood and Affect: Mood normal          Behavior: Behavior normal          Thought Content:  Thought content normal          Judgment: Judgment normal

## 2022-03-28 NOTE — PATIENT INSTRUCTIONS
- Ordered UGI - call central scheduling to get done  Follow up in 1 year for annual    - Will schedule EGD with [facility] - stop all antacids 7 days prior

## 2022-03-29 ENCOUNTER — PREP FOR PROCEDURE (OUTPATIENT)
Dept: BARIATRICS | Facility: CLINIC | Age: 62
End: 2022-03-29

## 2022-03-29 DIAGNOSIS — Z98.84 BARIATRIC SURGERY STATUS: Primary | ICD-10-CM

## 2022-04-11 NOTE — TELEPHONE ENCOUNTER
PT called to schedule np appointment informed pt of the waitlist  Pt stated she is already on the wait list confirmed with PT she is on the wait list as of 7/27/2021

## 2022-04-12 ENCOUNTER — HOSPITAL ENCOUNTER (OUTPATIENT)
Dept: RADIOLOGY | Facility: HOSPITAL | Age: 62
Discharge: HOME | End: 2022-04-12
Payer: MEDICARE

## 2022-04-12 DIAGNOSIS — Z48.815 ENCOUNTER FOR SURGICAL AFTERCARE FOLLOWING SURGERY OF DIGESTIVE SYSTEM: ICD-10-CM

## 2022-04-12 DIAGNOSIS — K91.2 POSTSURGICAL MALABSORPTION: ICD-10-CM

## 2022-04-12 DIAGNOSIS — Z98.84 BARIATRIC SURGERY STATUS: ICD-10-CM

## 2022-04-12 DIAGNOSIS — K21.9 GERD (GASTROESOPHAGEAL REFLUX DISEASE): ICD-10-CM

## 2022-04-12 PROCEDURE — 74240 X-RAY XM UPR GI TRC 1CNTRST: CPT

## 2022-04-14 ENCOUNTER — TELEPHONE (OUTPATIENT)
Dept: BARIATRICS | Facility: CLINIC | Age: 62
End: 2022-04-14

## 2022-04-14 DIAGNOSIS — Z98.84 BARIATRIC SURGERY STATUS: Primary | ICD-10-CM

## 2022-04-14 DIAGNOSIS — K21.9 GERD (GASTROESOPHAGEAL REFLUX DISEASE): ICD-10-CM

## 2022-04-14 DIAGNOSIS — R10.13 EPIGASTRIC PAIN: ICD-10-CM

## 2022-04-14 RX ORDER — SUCRALFATE 1 G/1
1 TABLET ORAL 4 TIMES DAILY
Qty: 120 TABLET | Refills: 2 | Status: SHIPPED | OUTPATIENT
Start: 2022-04-14 | End: 2022-05-14

## 2022-04-14 NOTE — TELEPHONE ENCOUNTER
Called patient to discuss UGI review with Dr Iesha Hamilton on the next steps  Will leave patient on schedule for potential EGD with [facility]  Today, patient is complaining of midepigastric pain that worsened since last seen however reflux appears to be better with increase of omeprazole to twice daily  Pain occurs primarily after eating  Will add carafate for now

## 2022-04-29 ENCOUNTER — OFFICE VISIT (OUTPATIENT)
Dept: BARIATRICS | Facility: CLINIC | Age: 62
End: 2022-04-29
Payer: MEDICARE

## 2022-04-29 VITALS
SYSTOLIC BLOOD PRESSURE: 130 MMHG | TEMPERATURE: 97.6 F | BODY MASS INDEX: 25.69 KG/M2 | HEART RATE: 57 BPM | HEIGHT: 63 IN | DIASTOLIC BLOOD PRESSURE: 78 MMHG | WEIGHT: 145 LBS

## 2022-04-29 DIAGNOSIS — Z98.84 BARIATRIC SURGERY STATUS: Primary | ICD-10-CM

## 2022-04-29 DIAGNOSIS — K21.9 GASTROESOPHAGEAL REFLUX DISEASE, UNSPECIFIED WHETHER ESOPHAGITIS PRESENT: ICD-10-CM

## 2022-04-29 PROCEDURE — 99213 OFFICE O/P EST LOW 20 MIN: CPT | Performed by: SURGERY

## 2022-04-29 NOTE — PROGRESS NOTES
Subjective:      62 y o  female with history of Yarelis King Dr Chani Hanson at [facility] 2018  Presents to the office today for annual and c/o of reflux  States she has seen an ENT due to her hoarse voice and throat irritation and was told she had severe acid reflux  She was refer to GI to assist with acid reflux but instead came here  Patient states her heartburn is present after every meal and she is constantly belching and burping  She also has associated epigastric pain  She had heartburn prior to the sleeve gastrectomy as well    She was started on Topamax by Hesham Santos and lost 8-10lbs on it, but she stopped it on her own because she was experiencing brain fog  Since then she has regained 6lbs over a week and a half  She is now eating more frequently and also resorting to comfort foods due to the pain  She is starting to feel depressed due to the heartburn and the weight regain  She takes Pantoprazole 40mg PO BID, Pepcid P qday, Carafate 1g QID, TUMS PRN      Initial: 263lb  Baltazar: 136lb  Current: 145lb    Historical Information   Past Medical History:   Diagnosis Date    Anxiety     Arthritis     Bulging lumbar disc     Depression     Diabetes mellitus ([facility] [address] )     Resolved HbA 1 c 5 5    Fatty liver     Hypertension     resolved    Renal disorder      Past Surgical History:   Procedure Laterality Date    CHOLECYSTECTOMY      COLONOSCOPY      EGD      dx brush wash    GASTRECTOMY SLEEVE LAPAROSCOPIC  09/2017    HERNIA REPAIR      Incisional, Umbilical    HYSTERECTOMY      JOINT REPLACEMENT Left     Hip    NEPHRECTOMY Left     partial    TONSILLECTOMY      WISDOM TOOTH EXTRACTION       Social History   Social History     Substance and Sexual Activity   Alcohol Use Not Currently    Comment: occasional     Social History     Substance and Sexual Activity   Drug Use Not on file     Social History     Tobacco Use   Smoking Status Former Smoker    Packs/day: 0 25  Years: 12 00    Pack years: 3 00   Smokeless Tobacco Never Used   Tobacco Comment    occasional      Family History:   Family History   Family history unknown: Yes       Meds/Allergies   all medications and allergies reviewed  Allergies   Allergen Reactions    Bee Venom Hives     Throat swells   Fish Allergy - Food Allergy Angioedema     Other reaction(s): throat closes - NOT allergic to shellfish - also had this reaction to fish oil capsules    Metformin GI Intolerance     Other reaction(s): Diarrhea       Objective   Review of Systems   Constitutional: Negative  HENT: Negative  Eyes: Negative  Respiratory: Negative  Cardiovascular: Negative  Gastrointestinal: Negative  Endocrine: Negative  Genitourinary: Negative  Musculoskeletal: Negative  Skin: Negative  Allergic/Immunologic: Negative  Neurological: Negative  Hematological: Negative  Psychiatric/Behavioral: Negative  All other systems reviewed and are negative  Objective:    /78 (BP Location: Left arm, Patient Position: Sitting, Cuff Size: Standard)   Pulse 57   Temp 97 6 °F (36 4 °C) (Tympanic)   Ht 5' 3" (1 6 m)   Wt 65 8 kg (145 lb)   BMI 25 69 kg/m²       Physical Exam  Vitals and nursing note reviewed  Constitutional:       Appearance: Normal appearance  HENT:      Head: Normocephalic and atraumatic  Nose: Nose normal       Mouth/Throat:      Mouth: Mucous membranes are moist       Pharynx: Oropharynx is clear  Eyes:      Extraocular Movements: Extraocular movements intact  Pupils: Pupils are equal, round, and reactive to light  Cardiovascular:      Rate and Rhythm: Normal rate and regular rhythm  Pulses: Normal pulses  Pulmonary:      Effort: Pulmonary effort is normal    Abdominal:      General: Abdomen is flat  Bowel sounds are normal       Palpations: Abdomen is soft  Comments: Incisions are C/D/I  Healing well, without erythema or drainage   No bulges noted     Musculoskeletal:         General: Normal range of motion  Cervical back: Normal range of motion and neck supple  Skin:     General: Skin is warm and dry  Neurological:      General: No focal deficit present  Mental Status: She is alert and oriented to person, place, and time  Mental status is at baseline  Psychiatric:         Mood and Affect: Mood normal          Behavior: Behavior normal          Thought Content: Thought content normal          Judgment: Judgment normal            Upper GI :   UPPER GI SERIES     INDICATION:  Status post gastric sleeve  Evaluate anatomy      COMPARISON:  None     IMAGES:  33     FLUOROSCOPY TIME:   0 9 minutes     TECHNIQUE:  The patient was given barium by mouth and images of the esophagus, stomach, and small bowel were obtained       FINDINGS:     The esophagus is normal in caliber  Esophageal motility is normal and emptying of contrast from the esophagus is prompt  There is no mucosal mass, ulceration or fold thickening identified      Expected appearance of the stomach status post sleeve gastrectomy is noted  No ulceration or mass is identified      Contrast empties promptly into the duodenum  The duodenum is normal in caliber  The ligament of Treitz/duodenojejunal junction lies in a normal position      Gastroesophageal reflux was not observed        Small transient sliding-type hiatal hernia was noted         IMPRESSION:     Small transient sliding-type hiatal hernia, otherwise expected appearance status post gastric sleeve  DATE OF SERVICE:  3/19/21     PHYSICIAN(S):  Melvin Feliciano MD - Attending Physician        INDICATION:  GERD (gastroesophageal reflux disease)     POST-OP DIAGNOSIS:  See the impression below      PREPROCEDURE:  Informed consent was obtained for the procedure, including sedation  Risks of perforation, hemorrhage, adverse drug reaction and aspiration were discussed   The patient was placed in the left lateral decubitus position      Patient was explained about the risks and benefits of the procedure  Risks including but not limited to bleeding, infection, and perforation were explained in detail  Also explained about less than 100% sensitivity with the exam and other alternatives      DETAILS OF PROCEDURE:  Patient was taken to the procedure room where a time out was performed to confirm correct patient and correct procedure  The patient underwent moderate sedation, which was administered by an anesthesia professional  The patient's blood pressure, heart rate, level of consciousness, respirations and oxygen were monitored throughout the procedure  The scope was introduced through the mouth and advanced to the third part of the duodenum  Retroflexion was performed in the cardia and fundus  Prior to the procedure, the patient's H  Pylori status was unknown  The patient experienced no blood loss  The procedure was not difficult  The patient tolerated the procedure well  There were no apparent complications       ANESTHESIA INFORMATION:  ASA: II  Anesthesia Type: IV Sedation with Anesthesia     FINDINGS:  · The patient is status post sleeve gastrectomy  There is dilatation of upper portion of the sleeve with redundant fundus  No hiatal hernia  No esophagitis or gastritis         SPECIMENS:  * No specimens in log *     IMPRESSION:  Dilated fundus        RECOMMENDATION:  She may benefit from conversion to Newton-en-Y gastric bypass if her reflux symptoms persist   Would refer to bariatric surgery if patient agrees        Assessment/Plan:       · Kira Hernandez is a 62 y o  female  s/p Vertical Loyde Pert Dr Stevie Sanchez at [facility] in 2018 with relfux refractory to medication, small hiatal hernia seen on EGD, and dilated fundus on endoscopy done by Dr Stevie Sanchez   · Continue current anti-reflux medications   Patient with compliance to PPI BID, H2 Blocker daily, Carafate, and TUMS  · Continue vitamins as directed, with routine blood work follow up  · Will plan for EGD with Bravo, currently scheduled for 6/29/22  Will evaluate to for esophagitis and assess for GERD  · Discussed surgical options of Stretta, LINX, and gastric bypass with patient  After endoscopy, will discuss surgical options  · Follow up per bariatric protocol and with dieticians

## 2022-05-10 DIAGNOSIS — E66.3 OVERWEIGHT: Primary | ICD-10-CM

## 2022-05-10 RX ORDER — TOPIRAMATE 50 MG/1
50 TABLET, FILM COATED ORAL EVERY 12 HOURS SCHEDULED
Qty: 60 TABLET | Refills: 1 | Status: SHIPPED | OUTPATIENT
Start: 2022-05-10

## 2022-06-21 ENCOUNTER — OFFICE VISIT (OUTPATIENT)
Dept: BARIATRICS | Facility: CLINIC | Age: 62
End: 2022-06-21
Payer: MEDICARE

## 2022-06-21 VITALS
BODY MASS INDEX: 25.43 KG/M2 | HEART RATE: 50 BPM | WEIGHT: 143.5 LBS | OXYGEN SATURATION: 98 % | HEIGHT: 63 IN | SYSTOLIC BLOOD PRESSURE: 105 MMHG | TEMPERATURE: 97.8 F | DIASTOLIC BLOOD PRESSURE: 70 MMHG

## 2022-06-21 DIAGNOSIS — I10 ESSENTIAL HYPERTENSION: ICD-10-CM

## 2022-06-21 DIAGNOSIS — K21.9 GASTROESOPHAGEAL REFLUX DISEASE, UNSPECIFIED WHETHER ESOPHAGITIS PRESENT: ICD-10-CM

## 2022-06-21 DIAGNOSIS — E66.3 OVERWEIGHT: Primary | ICD-10-CM

## 2022-06-21 PROCEDURE — 99214 OFFICE O/P EST MOD 30 MIN: CPT | Performed by: PHYSICIAN ASSISTANT

## 2022-06-21 RX ORDER — PHENTERMINE HYDROCHLORIDE 37.5 MG/1
18.75 TABLET ORAL DAILY
Qty: 30 TABLET | Refills: 0 | Status: SHIPPED | OUTPATIENT
Start: 2022-06-21 | End: 2022-07-28

## 2022-06-21 NOTE — ASSESSMENT & PLAN NOTE
-Patient is pursuing Conservative Program  -Initial weight loss goal of 5-10% weight loss for improved health      Initial:160 3 lbs   Current:143 5 lbs   Change:-16 8lbs   GOAL: 135-141 lbs     Goals:  Food log (ie ) www myfitnesspal com,sparkpeople  com,loseit com,calorieking  com,etc  baritastic-weigh and measure food   No sugary beverages  At least 64oz of water daily  Measure creamer and monkfruit   Increase physical activity by 10 minutes daily  Gradually increase physical activity to a goal of 5 days per week for 30 minutes of MODERATE intensity PLUS 2 days per week of FULL BODY resistance training-keep up the walking   9629-8369 calories   Follow the 30/60 minute rule   Can add pecans to salad   Weigh yourself only once per week    Medications  -to stop topamax due to mood changes per psychiatry  Take 25mg of topamax 2 times a day for 1 week  Then take 25mg at night for 1 week and then stop  -she has noticed increased appetite since decreased topamax dose from 200mg a day and has started to gain weight back  Reviewed stress test from 3/22/22 and wnl to start 1/2 tablet 18 75mg of phentermine    Discussed side effects and will start after topamax weaned and EGD next week     -has a metformin allergy, already taking wellbutrin and has no AOM medication coverage

## 2022-06-21 NOTE — PROGRESS NOTES
Assessment/Plan:    Overweight  -Patient is pursuing Conservative Program  -Initial weight loss goal of 5-10% weight loss for improved health      Initial:160 3 lbs   Current:143 5 lbs   Change:-16 8lbs   GOAL: 135-141 lbs     Goals:  Food log (ie ) www myfitnesspal com,sparkpeople  com,loseit com,calorieking  com,etc  baritastic-weigh and measure food   No sugary beverages  At least 64oz of water daily  Measure creamer and monkfruit   Increase physical activity by 10 minutes daily  Gradually increase physical activity to a goal of 5 days per week for 30 minutes of MODERATE intensity PLUS 2 days per week of FULL BODY resistance training-keep up the walking   2175-9622 calories   Follow the 30/60 minute rule   Can add pecans to salad   Weigh yourself only once per week    Medications  -to stop topamax due to mood changes per psychiatry  Take 25mg of topamax 2 times a day for 1 week  Then take 25mg at night for 1 week and then stop  -she has noticed increased appetite since decreased topamax dose from 200mg a day and has started to gain weight back  Reviewed stress test from 3/22/22 and wnl to start 1/2 tablet 18 75mg of phentermine  Discussed side effects and will start after topamax weaned and EGD next week     -has a metformin allergy, already taking wellbutrin and has no AOM medication coverage      Essential hypertension  No longer taking meds for BP       GERD (gastroesophageal reflux disease)    Has EGD next week        Follow up in approximately 2 months with Non-Surgical Physician/Advanced Practitioner  Diagnoses and all orders for this visit:    Overweight  -     phentermine (ADIPEX-P) 37 5 MG tablet; Take 0 5 tablets (18 75 mg total) by mouth in the morning    Essential hypertension    Gastroesophageal reflux disease, unspecified whether esophagitis present          Subjective:   Chief Complaint   Patient presents with    Follow-up     MWM 3 mo f/u wt gain           Patient ID: Areli Saravia  is a 62 y o  female with excess weight/obesity here to pursue weight managment  Patient is pursuing Conservative Program      HPI She is s/p Zaina Leyva at [facility] 2018  She has been on topmax 200mg daily but was decreased to 100mg daily 3 months ago  Her psychiatrist doesn't want her on topamax  She does feel more agitated since she has been on it  She sees Dr Genet Santos  She has been starting to  to get more hungry on lower dose and she does not feel it is helping as much  Wt Readings from Last 10 Encounters:   06/21/22 65 1 kg (143 lb 8 oz)   04/29/22 65 8 kg (145 lb)   03/28/22 63 3 kg (139 lb 8 oz)   03/21/22 63 8 kg (140 lb 11 2 oz)   12/20/21 66 5 kg (146 lb 11 2 oz)   10/25/21 68 1 kg (150 lb 1 6 oz)   09/15/21 69 9 kg (154 lb)   08/03/21 72 7 kg (160 lb 4 8 oz)   06/24/21 71 4 kg (157 lb 4 8 oz)   05/26/21 70 1 kg (154 lb 8 oz)       Food logging:no, but will start  Increased appetite/cravings:yes in the afternoon  Fruit/Vegetable servings:  Exercise:limited by hip pain, will be restarting after injection  Hydration:2 64 oz , 1/2 coffee in Am    Colonoscopy-Completed    The following portions of the patient's history were reviewed and updated as appropriate:   She  has a past medical history of Anxiety, Arthritis, Bulging lumbar disc, Depression, Diabetes mellitus ([address] ), Fatty liver, Hypertension, and Renal disorder    She   Patient Active Problem List    Diagnosis Date Noted    Gastroparesis due to DM ([address] ) 09/15/2021    Overweight 08/03/2021    GERD (gastroesophageal reflux disease) 08/03/2021    Status post laparoscopic sleeve gastrectomy 05/26/2021    Postsurgical malabsorption 05/26/2021    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    Vitamin B 12 deficiency 02/16/2018    Psoriasis 01/19/2016    History of renal cell carcinoma 04/15/2015    Bipolar affective disorder ([address] ) 09/05/2014    Chronic nonalcoholic liver disease 28/34/3897    Chronic airway obstruction ([facility] [address] ) 02/04/2014    Essential hypertension 10/03/2012     She  has a past surgical history that includes Cholecystectomy; Colonoscopy; EGD; Hernia repair; Hysterectomy; Nephrectomy (Left); Joint replacement (Left); Tonsillectomy; [place] tooth extraction; and GASTRECTOMY SLEEVE LAPAROSCOPIC (09/2017)  Her Family history is unknown by patient  She  reports that she has quit smoking  She has a 3 00 pack-year smoking history  She has never used smokeless tobacco  She reports previous alcohol use  No history on file for drug use    Current Outpatient Medications   Medication Sig Dispense Refill    buPROPion (WELLBUTRIN XL) 300 mg 24 hr tablet       clonazePAM (KlonoPIN) 0 5 mg tablet       Diclofenac Sodium (VOLTAREN) 1 % Apply 1 g topically daily      Docusate Sodium (DSS) 100 MG CAPS Take 100 mg by mouth      FLUoxetine (PROzac) 20 mg capsule       hydrochlorothiazide (HYDRODIURIL) 12 5 mg tablet Take 12 5 mg by mouth daily      loratadine (CLARITIN) 10 mg tablet Take 1 tablet by mouth daily      lurasidone (LATUDA) 80 mg tablet Take 80 mg by mouth       oxybutynin (DITROPAN-XL) 10 MG 24 hr tablet Take 10 mg by mouth daily      phentermine (ADIPEX-P) 37 5 MG tablet Take 0 5 tablets (18 75 mg total) by mouth in the morning 30 tablet 0    prazosin (MINIPRESS) 5 mg capsule       traZODone (DESYREL) 150 mg tablet Take 150 mg by mouth daily      vitamin B-12 (CYANOCOBALAMIN) 250 MCG tablet Take 250 mcg by mouth daily        Breo Ellipta 100-25 MCG/INH inhaler Inhale 1 puff as needed      buPROPion (WELLBUTRIN XL) 150 mg 24 hr tablet Take 80 mg by mouth daily      EPINEPHrine (EPIPEN) 0 3 mg/0 3 mL SOAJ  (Patient not taking: Reported on 6/21/2022)      famotidine (PEPCID) 10 mg tablet Take 1 tablet (10 mg total) by mouth 2 (two) times a day (Patient not taking: Reported on 6/21/2022) 60 tablet 1    ipratropium (ATROVENT) 0 06 % nasal spray 1-2 sprays into each nostril 4 (four) times a day as needed (Patient not taking: No sig reported)      Linzess 290 MCG CAPS Take 290 mg by mouth daily (Patient not taking: No sig reported)      methocarbamol (ROBAXIN) 500 mg tablet Take 1 tablet by mouth daily (Patient not taking: No sig reported)      pantoprazole (PROTONIX) 40 mg tablet Take 1 tablet (40 mg total) by mouth 2 (two) times a day 60 tablet 3    sucralfate (CARAFATE) 1 g tablet Take 1 tablet (1 g total) by mouth 4 (four) times a day Please crush pill and mix with small amount of water to take as a suspension  120 tablet 2    topiramate (Topamax) 50 MG tablet Take 1 tablet (50 mg total) by mouth every 12 (twelve) hours (Patient not taking: Reported on 6/21/2022) 60 tablet 1     No current facility-administered medications for this visit       Current Outpatient Medications on File Prior to Visit   Medication Sig    buPROPion (WELLBUTRIN XL) 300 mg 24 hr tablet     clonazePAM (KlonoPIN) 0 5 mg tablet     Diclofenac Sodium (VOLTAREN) 1 % Apply 1 g topically daily    Docusate Sodium (DSS) 100 MG CAPS Take 100 mg by mouth    FLUoxetine (PROzac) 20 mg capsule     hydrochlorothiazide (HYDRODIURIL) 12 5 mg tablet Take 12 5 mg by mouth daily    loratadine (CLARITIN) 10 mg tablet Take 1 tablet by mouth daily    lurasidone (LATUDA) 80 mg tablet Take 80 mg by mouth     oxybutynin (DITROPAN-XL) 10 MG 24 hr tablet Take 10 mg by mouth daily    prazosin (MINIPRESS) 5 mg capsule     traZODone (DESYREL) 150 mg tablet Take 150 mg by mouth daily    vitamin B-12 (CYANOCOBALAMIN) 250 MCG tablet Take 250 mcg by mouth daily      [DISCONTINUED] topiramate (Topamax) 100 mg tablet Take 1 tablet (100 mg total) by mouth 2 (two) times a day    Breo Ellipta 100-25 MCG/INH inhaler Inhale 1 puff as needed    buPROPion (WELLBUTRIN XL) 150 mg 24 hr tablet Take 80 mg by mouth daily    EPINEPHrine (EPIPEN) 0 3 mg/0 3 mL SOAJ  (Patient not taking: Reported on 6/21/2022)    famotidine (PEPCID) 10 mg tablet Take 1 tablet (10 mg total) by mouth 2 (two) times a day (Patient not taking: Reported on 6/21/2022)    ipratropium (ATROVENT) 0 06 % nasal spray 1-2 sprays into each nostril 4 (four) times a day as needed (Patient not taking: No sig reported)    Linzess 290 MCG CAPS Take 290 mg by mouth daily (Patient not taking: No sig reported)    methocarbamol (ROBAXIN) 500 mg tablet Take 1 tablet by mouth daily (Patient not taking: No sig reported)    pantoprazole (PROTONIX) 40 mg tablet Take 1 tablet (40 mg total) by mouth 2 (two) times a day    sucralfate (CARAFATE) 1 g tablet Take 1 tablet (1 g total) by mouth 4 (four) times a day Please crush pill and mix with small amount of water to take as a suspension   topiramate (Topamax) 50 MG tablet Take 1 tablet (50 mg total) by mouth every 12 (twelve) hours (Patient not taking: Reported on 6/21/2022)    [DISCONTINUED] clonazePAM (KlonoPIN) 1 mg tablet Take 1 mg by mouth as needed    [DISCONTINUED] FLUoxetine (PROzac) 10 mg capsule Take 10 mg by mouth daily    [DISCONTINUED] fluticasone (FLONASE) 50 mcg/act nasal spray 2 sprays into each nostril daily    [DISCONTINUED] HYDROcodone-acetaminophen (NORCO) 5-325 mg per tablet Take 1-2 tablets by mouth (Patient not taking: No sig reported)    [DISCONTINUED] lisinopril (ZESTRIL) 10 mg tablet Take 10 mg by mouth daily    [DISCONTINUED] meloxicam (MOBIC) 15 mg tablet Take 15 mg by mouth daily     No current facility-administered medications on file prior to visit  She is allergic to bee venom, fish allergy - food allergy, and metformin       Review of Systems   Constitutional: Negative for chills and fever  Respiratory: Negative for shortness of breath  Cardiovascular: Negative for chest pain and palpitations  Gastrointestinal: Negative for abdominal pain, constipation, diarrhea and vomiting  Occasional GERD     Genitourinary: Negative for difficulty urinating  Musculoskeletal: Positive for arthralgias  Negative for back pain  Skin: Negative for rash  Neurological: Negative for headaches  Psychiatric/Behavioral: Negative for dysphoric mood and sleep disturbance  The patient is not nervous/anxious  Objective:    /70 (BP Location: Left arm, Patient Position: Sitting, Cuff Size: Standard)   Pulse (!) 50   Temp 97 8 °F (36 6 °C) (Tympanic)   Ht 5' 3" (1 6 m)   Wt 65 1 kg (143 lb 8 oz)   SpO2 98%   Breastfeeding No   BMI 25 42 kg/m²      Physical Exam  Vitals and nursing note reviewed  Constitutional:       General: She is not in acute distress  Appearance: She is well-developed  She is obese  HENT:      Head: Normocephalic and atraumatic  Eyes:      Conjunctiva/sclera: Conjunctivae normal    Neck:      Thyroid: No thyromegaly  Pulmonary:      Effort: Pulmonary effort is normal  No respiratory distress  Skin:     Findings: No rash (visible)  Neurological:      Mental Status: She is alert and oriented to person, place, and time     Psychiatric:         Behavior: Behavior normal

## 2022-06-28 RX ORDER — SODIUM CHLORIDE 9 MG/ML
125 INJECTION, SOLUTION INTRAVENOUS CONTINUOUS
Status: CANCELLED | OUTPATIENT
Start: 2022-06-28

## 2022-06-29 ENCOUNTER — ANESTHESIA EVENT (OUTPATIENT)
Dept: GASTROENTEROLOGY | Facility: HOSPITAL | Age: 62
End: 2022-06-29

## 2022-06-29 ENCOUNTER — TELEPHONE (OUTPATIENT)
Dept: BARIATRICS | Facility: CLINIC | Age: 62
End: 2022-06-29

## 2022-06-29 ENCOUNTER — ANESTHESIA (OUTPATIENT)
Dept: GASTROENTEROLOGY | Facility: HOSPITAL | Age: 62
End: 2022-06-29

## 2022-06-29 ENCOUNTER — HOSPITAL ENCOUNTER (OUTPATIENT)
Dept: GASTROENTEROLOGY | Facility: HOSPITAL | Age: 62
Setting detail: OUTPATIENT SURGERY
Discharge: HOME | End: 2022-06-29
Attending: SURGERY | Admitting: SURGERY
Payer: MEDICARE

## 2022-06-29 VITALS
HEIGHT: 63 IN | WEIGHT: 144 LBS | OXYGEN SATURATION: 100 % | TEMPERATURE: 97.6 F | DIASTOLIC BLOOD PRESSURE: 67 MMHG | BODY MASS INDEX: 25.52 KG/M2 | RESPIRATION RATE: 21 BRPM | HEART RATE: 48 BPM | SYSTOLIC BLOOD PRESSURE: 140 MMHG

## 2022-06-29 DIAGNOSIS — Z98.84 BARIATRIC SURGERY STATUS: ICD-10-CM

## 2022-06-29 PROCEDURE — 88305 TISSUE EXAM BY PATHOLOGIST: CPT | Performed by: PATHOLOGY

## 2022-06-29 PROCEDURE — 43239 EGD BIOPSY SINGLE/MULTIPLE: CPT | Performed by: SURGERY

## 2022-06-29 RX ORDER — SODIUM CHLORIDE 9 MG/ML
125 INJECTION, SOLUTION INTRAVENOUS CONTINUOUS
Status: DISCONTINUED | OUTPATIENT
Start: 2022-06-29 | End: 2022-07-03 | Stop reason: HOSPADM

## 2022-06-29 RX ORDER — LIDOCAINE HYDROCHLORIDE 20 MG/ML
INJECTION, SOLUTION EPIDURAL; INFILTRATION; INTRACAUDAL; PERINEURAL AS NEEDED
Status: DISCONTINUED | OUTPATIENT
Start: 2022-06-29 | End: 2022-06-29

## 2022-06-29 RX ORDER — PROPOFOL 10 MG/ML
INJECTION, EMULSION INTRAVENOUS AS NEEDED
Status: DISCONTINUED | OUTPATIENT
Start: 2022-06-29 | End: 2022-06-29

## 2022-06-29 RX ADMIN — PROPOFOL 40 MG: 10 INJECTION, EMULSION INTRAVENOUS at 08:25

## 2022-06-29 RX ADMIN — PROPOFOL 170 MG: 10 INJECTION, EMULSION INTRAVENOUS at 08:20

## 2022-06-29 RX ADMIN — PROPOFOL 30 MG: 10 INJECTION, EMULSION INTRAVENOUS at 08:22

## 2022-06-29 RX ADMIN — LIDOCAINE HYDROCHLORIDE 60 MG: 20 INJECTION, SOLUTION EPIDURAL; INFILTRATION; INTRACAUDAL at 08:20

## 2022-06-29 NOTE — TELEPHONE ENCOUNTER
Patient had EGD W/Bravo today, she is having severe  pain on top of her stomach  She denies fever and vomiting  Her pain is 9/10  I asked provider in office, she recommend her take Tylenol liquid and try to walk   What is your recommendation

## 2022-06-29 NOTE — ANESTHESIA POSTPROCEDURE EVALUATION
Post-Op Assessment Note    CV Status:  Stable    Pain management: adequate     Mental Status:  Alert and awake   Hydration Status:  Euvolemic   PONV Controlled:  Controlled   Airway Patency:  Patent      Post Op Vitals Reviewed: Yes      Staff: Anesthesiologist, CRNA         No complications documented      BP      Temp      Pulse    Resp      SpO2      /67   Pulse (!) 48   Temp 97 6 °F (36 4 °C) (Temporal)   Resp 21   Ht 5' 3" (1 6 m)   Wt 65 3 kg (144 lb)   SpO2 100%   BMI 25 51 kg/m²

## 2022-06-29 NOTE — H&P
62 y o  female with history of Mary Euler Dr Kevin Courtney at [facility] 2018  Presents to the office today for annual and c/o of reflux  States she has seen an ENT due to her hoarse voice and throat irritation and was told she had severe acid reflux  She was refer to GI to assist with acid reflux but instead came here       Patient states her heartburn is present after every meal and she is constantly belching and burping  She also has associated epigastric pain  She had heartburn prior to the sleeve gastrectomy as well  Here for an EGD potential Bravo to evaluate the anatomy of the GI tract in to assess for reflux  Physical Exam    /72   Pulse 56   Temp 97 6 °F (36 4 °C) (Temporal)   Resp 20   Ht 5' 3" (1 6 m)   Wt 65 3 kg (144 lb)   SpO2 100%   BMI 25 51 kg/m²    AAOx3  RRR  CTA B  Abdomen obese  Benign  A/P:    This is a 62 y o  female status post a sleeve gastrectomy in 2018 and now heartburn  Will proceed with the EGD and biopsies        Rashawn Avila MD  06/29/22  7:45 AM

## 2022-06-29 NOTE — ANESTHESIA PREPROCEDURE EVALUATION
Procedure:  EGD  BRAVO PH MONITORING    Relevant Problems   CARDIO   (+) Essential hypertension      GI/HEPATIC  Currently having sever reflux   (+) Chronic nonalcoholic liver disease   (+) GERD (gastroesophageal reflux disease)      NEURO/PSYCH   (+) Gastroparesis due to DM (HCC)   (+) History of renal cell carcinoma      PULMONARY   (+) Chronic airway obstruction (HCC)        Physical Exam    Airway    Mallampati score: II  TM Distance: >3 FB  Neck ROM: full     Dental   Comment: No bottom teeth, upper dentures,     Cardiovascular  Rhythm: regular, Rate: normal, Cardiovascular exam normal    Pulmonary  Pulmonary exam normal Breath sounds clear to auscultation,     Other Findings        Anesthesia Plan  ASA Score- 2     Anesthesia Type- IV sedation with anesthesia with ASA Monitors  Additional Monitors:   Airway Plan:           Plan Factors-Exercise tolerance (METS): >4 METS  Chart reviewed  Patient summary reviewed  Patient is not a current smoker  Induction- intravenous  Postoperative Plan-     Informed Consent- Anesthetic plan and risks discussed with patient

## 2022-07-07 ENCOUNTER — TELEPHONE (OUTPATIENT)
Dept: BARIATRICS | Facility: CLINIC | Age: 62
End: 2022-07-07

## 2022-07-07 NOTE — TELEPHONE ENCOUNTER
Yes she could  She will need a refill sooner   She will be do for her refills sooner so if she can jsut let us know before she is due so we can call in script with new dosage

## 2022-07-07 NOTE — TELEPHONE ENCOUNTER
Patient is calling stating that taking a 1/2 tablet of Phentermine is not decreasing her appetite  She is asking if she could take 1 tablet instead  Patient also states that she has not experienced any side effects that the med could cause  Please advise

## 2022-07-14 ENCOUNTER — OFFICE VISIT (OUTPATIENT)
Dept: BARIATRICS | Facility: CLINIC | Age: 62
End: 2022-07-14
Payer: MEDICARE

## 2022-07-14 VITALS
DIASTOLIC BLOOD PRESSURE: 70 MMHG | HEIGHT: 63 IN | HEART RATE: 80 BPM | TEMPERATURE: 98.5 F | BODY MASS INDEX: 26.05 KG/M2 | WEIGHT: 147 LBS | SYSTOLIC BLOOD PRESSURE: 110 MMHG

## 2022-07-14 DIAGNOSIS — J44.9 CHRONIC OBSTRUCTIVE PULMONARY DISEASE, UNSPECIFIED COPD TYPE (HCC): ICD-10-CM

## 2022-07-14 DIAGNOSIS — K31.84 GASTROPARESIS DUE TO DM (HCC): ICD-10-CM

## 2022-07-14 DIAGNOSIS — E11.43 GASTROPARESIS DUE TO DM (HCC): ICD-10-CM

## 2022-07-14 DIAGNOSIS — Z98.84 BARIATRIC SURGERY STATUS: Primary | ICD-10-CM

## 2022-07-14 DIAGNOSIS — K21.9 GERD (GASTROESOPHAGEAL REFLUX DISEASE): ICD-10-CM

## 2022-07-14 PROCEDURE — 99213 OFFICE O/P EST LOW 20 MIN: CPT | Performed by: SURGERY

## 2022-07-14 RX ORDER — PANTOPRAZOLE SODIUM 40 MG/1
40 TABLET, DELAYED RELEASE ORAL 2 TIMES DAILY
Qty: 30 TABLET | Refills: 1 | Status: SHIPPED | OUTPATIENT
Start: 2022-07-14

## 2022-07-14 NOTE — PROGRESS NOTES
Subjective:      65 y o  female with history of Vertical Sleeve Gastrectomy with Kartik in 2018, presents to the office today for follow up of EGD with Torres performed on 6/29/22 by Dr Cleopatra Daily  Patient has had symptoms of reflux, hoarse voice and throat irritation  She also reports constant belching and burping, however, she note that her symptoms have improved since we saw her last  Currently taking protonix qD    She has been seeing medical weight management and is currently on phentermine    Highest weight - 268 lbs  Baltazar - 136 lbs (2021)  Current weight - 147 lbs    Historical Information   Past Medical History:   Diagnosis Date    Anxiety     Arthritis     Bulging lumbar disc     Depression     Diabetes mellitus ([address] )     Resolved HbA 1 c 5 5    Fatty liver     Hypertension     resolved    Renal disorder      Past Surgical History:   Procedure Laterality Date    CHOLECYSTECTOMY      COLONOSCOPY      EGD      dx brush wash    GASTRECTOMY SLEEVE LAPAROSCOPIC  09/2017    HERNIA REPAIR      Incisional, Umbilical    HYSTERECTOMY      JOINT REPLACEMENT Left     Hip    NEPHRECTOMY Left     partial    TONSILLECTOMY      WISDOM TOOTH EXTRACTION       Social History   Social History     Substance and Sexual Activity   Alcohol Use Not Currently    Comment: occasional     Social History     Substance and Sexual Activity   Drug Use Never     Social History     Tobacco Use   Smoking Status Former Smoker    Packs/day: 0 25    Years: 12 00    Pack years: 3 00   Smokeless Tobacco Never Used   Tobacco Comment    occasional      Family History: non-contributory    Meds/Allergies   all medications and allergies reviewed  Allergies   Allergen Reactions    Bee Venom Hives     Throat swells      Fish Allergy - Food Allergy Angioedema     Other reaction(s): throat closes - NOT allergic to shellfish - also had this reaction to fish oil capsules    Metformin GI Intolerance     Other reaction(s): Diarrhea Objective   Review of Systems   Constitutional: Negative for chills and fever  HENT: Negative for ear pain and sore throat  Eyes: Negative for pain and visual disturbance  Respiratory: Negative for cough and shortness of breath  Cardiovascular: Negative for chest pain and palpitations  Gastrointestinal: Negative for abdominal pain and vomiting  +GERD   Genitourinary: Negative for dysuria and hematuria  Musculoskeletal: Negative for arthralgias and back pain  Skin: Negative for color change and rash  Neurological: Negative for seizures and syncope  All other systems reviewed and are negative  Objective:    /70   Pulse 80   Temp 98 5 °F (36 9 °C) (Tympanic)   Ht 5' 3" (1 6 m)   Wt 66 7 kg (147 lb)   BMI 26 04 kg/m²       Physical Exam  Constitutional:       Appearance: Normal appearance  She is obese  HENT:      Head: Normocephalic and atraumatic  Cardiovascular:      Rate and Rhythm: Normal rate  Pulmonary:      Effort: Pulmonary effort is normal    Musculoskeletal:         General: Normal range of motion  Skin:     General: Skin is warm and dry  Neurological:      General: No focal deficit present  Mental Status: She is alert and oriented to person, place, and time  Psychiatric:         Mood and Affect: Mood normal          Behavior: Behavior normal              EGD (6/29/22):   DETAILS OF PROCEDURE:  Patient was taken to the procedure room where a time out was performed to confirm correct patient and correct procedure  The patient underwent monitored anesthesia care, which was administered by an anesthesia professional  The patient's blood pressure, heart rate, level of consciousness, respirations and oxygen were monitored throughout the procedure  The scope was advanced to the second part of the duodenum  Retroflexion was performed in the fundus  Prior to the procedure, the patient's H  Pylori status was unknown   The patient experienced no blood loss  The procedure was not difficult  The patient tolerated the procedure well  There were no apparent complications  62 y o  female with history of [place] [employer] Dr Josee Eugene at [facility] 2018  Presents to the office today for annual and c/o of reflux  States she has seen an ENT due to her hoarse voice and throat irritation and was told she had severe acid reflux  She was refer to GI to assist with acid reflux but instead came here       Patient states her heartburn is present after every meal and she is constantly belching and burping  She also has associated epigastric pain  She had heartburn prior to the sleeve gastrectomy as well      Here for an EGD potential Bravo to evaluate the anatomy of the GI tract in to assess for reflux       ANESTHESIA INFORMATION:  ASA: II  Anesthesia Type: IV Sedation with Anesthesia     MEDICATIONS:  No administrations occurring from 0814 to 0827 on 06/29/22         FINDINGS:  · Mild, generalized erythematous mucosa in the stomach; performed 6 cold forceps biopsies to rule out H  pylori  · Small sliding hiatal hernia (type I hiatal hernia) without Odella Bonus lesions present        SPECIMENS:  ID Type Source Tests Collected by Time Destination   1 : r/o h  pylori Tissue Stomach TISSUE Mino Pardo MD 6/29/2022 0688              IMPRESSION:  Sleeve pouchitis with a fairly large retained fundus  Type 1 paraesophageal hernia  Successful deployment of Bravo probe at 32 cm from the dens a Z-line was found to be at 38 cm from the dens    Bravo - [place] - 10 2  Pathology - pending     Assessment/Plan:       Shanta Agustin is a 65 y o  female  s/p Vertical Sleeve Gastrectomy with Kartik now with refractory GERD     There are no diagnoses linked to this encounter     · Increase PPI to BID  · Continue vitamins as directed, with routine blood work follow up  · Continue medical weight management for weight loss  · Follow up per bariatric protocol and with dieticians      Adrianna Coon MD  Bariatric Surgery

## 2022-07-27 DIAGNOSIS — E66.3 OVERWEIGHT: ICD-10-CM

## 2022-07-28 RX ORDER — PHENTERMINE HYDROCHLORIDE 37.5 MG/1
18.75 TABLET ORAL DAILY
Qty: 30 TABLET | Refills: 0 | OUTPATIENT
Start: 2022-07-28

## 2022-08-22 ENCOUNTER — TELEPHONE (OUTPATIENT)
Dept: OTHER | Facility: OTHER | Age: 62
End: 2022-08-22

## 2022-08-23 NOTE — TELEPHONE ENCOUNTER
Called patient and left a message to give the office a call back to reschedule her 8/24/22 appointment with Christina Clancy PA-C that she had cancelled through My Chart

## 2022-10-12 PROBLEM — E86.0 LUETSCHER'S SYNDROME: Status: RESOLVED | Noted: 2021-02-16 | Resolved: 2022-10-12

## 2022-11-22 ENCOUNTER — TELEPHONE (OUTPATIENT)
Dept: PSYCHIATRY | Facility: CLINIC | Age: 62
End: 2022-11-22

## 2022-11-22 NOTE — TELEPHONE ENCOUNTER
Pt called and writer confirmed that pt is still on wait list and once availability is open for [facility] intake will call to set up appt

## 2022-12-14 ENCOUNTER — OFFICE VISIT (OUTPATIENT)
Dept: BARIATRICS | Facility: CLINIC | Age: 62
End: 2022-12-14

## 2022-12-14 VITALS
WEIGHT: 167 LBS | HEIGHT: 63 IN | BODY MASS INDEX: 29.59 KG/M2 | DIASTOLIC BLOOD PRESSURE: 80 MMHG | SYSTOLIC BLOOD PRESSURE: 130 MMHG | RESPIRATION RATE: 16 BRPM | HEART RATE: 74 BPM

## 2022-12-14 DIAGNOSIS — K76.9 CHRONIC NONALCOHOLIC LIVER DISEASE: ICD-10-CM

## 2022-12-14 DIAGNOSIS — E66.3 OVERWEIGHT: Primary | ICD-10-CM

## 2022-12-14 RX ORDER — ALBUTEROL SULFATE 90 UG/1
AEROSOL, METERED RESPIRATORY (INHALATION)
COMMUNITY
Start: 2022-09-26

## 2022-12-14 RX ORDER — FLUTICASONE PROPIONATE 50 MCG
SPRAY, SUSPENSION (ML) NASAL
COMMUNITY
Start: 2022-09-26

## 2022-12-14 RX ORDER — NALTREXONE HYDROCHLORIDE 50 MG/1
50 TABLET, FILM COATED ORAL DAILY
Qty: 30 TABLET | Refills: 1 | Status: SHIPPED | OUTPATIENT
Start: 2022-12-14

## 2022-12-14 NOTE — PROGRESS NOTES
Assessment/Plan:    Overweight  -Patient is pursuing Conservative Program  -Initial weight loss goal of 5-10% weight loss for improved health    Initial:160 3 lbs   Current:167  Change:+7 3 lb   GOAL: 135-141 lbs     Goals:  -to restart food logging  -discussed continuing on stationary bike even if doing short increments  Discussed slowly building time on the bike  -continue with water intake    Medications  -to add on naltrexone since already taking wellbutrin to mimic effect of contrave      -Prior meds-has a metformin allergy, avoid topamax due to mood changes  Chronic nonalcoholic liver disease  -should improve with weight loss, dietary, and lifestyle changes          Follow up in approximately 2 months with Non-Surgical Physician/Advanced Practitioner  Diagnoses and all orders for this visit:    Overweight  -     naltrexone (REVIA) 50 mg tablet; Take 1 tablet (50 mg total) by mouth daily    Chronic nonalcoholic liver disease    Other orders  -     albuterol (PROVENTIL HFA,VENTOLIN HFA) 90 mcg/act inhaler  -     fluticasone (FLONASE) 50 mcg/act nasal spray          Subjective:   Chief Complaint   Patient presents with   • Follow-up     MWM 6mth f/u; waist 37 5in        Patient ID: Matilde Coon  is a 65 y o  female with excess weight/obesity here to pursue weight managment  Patient is pursuing Conservative Program      HPI here for MWM followup  She is s/p Vertical Sleeve Gastrectomy with Dr Zoraida Diallo at [facility] 2018  She was on topamax but psychiatry thought it was making her agitated so they recommend she stop it  She has had weight regain since then  She did try phentermine but It only helped with her appetite for about 4 hours and she didn't lose any weight with it  She does feel her appetite is larger in the afternoon and is emotional eater as well   She does have a therapist and hopefully will be seeing a new therapist soon  Wt Readings from Last 10 Encounters:   12/14/22 75 8 kg (167 lb)   07/14/22 66 7 kg (147 lb)   06/29/22 65 3 kg (144 lb)   06/21/22 65 1 kg (143 lb 8 oz)   04/29/22 65 8 kg (145 lb)   03/28/22 63 3 kg (139 lb 8 oz)   03/21/22 63 8 kg (140 lb 11 2 oz)   12/20/21 66 5 kg (146 lb 11 2 oz)   10/25/21 68 1 kg (150 lb 1 6 oz)   09/15/21 69 9 kg (154 lb)       Food logging:plans to start  Increased appetite/cravings:yes in the evening  Fruit/Vegetable servings:  Exercise:stationary bike just started   Only able to do small increments because getting SOB  Hydration:1 cup coffee and water at least 80 oz        The following portions of the patient's history were reviewed and updated as appropriate: She  has a past medical history of Anxiety, Arthritis, Bulging lumbar disc, Depression, Diabetes mellitus ([place] [address] ), Fatty liver, Hypertension, and Renal disorder  She   Patient Active Problem List    Diagnosis Date Noted   • Gastroparesis due to DM ([place] [address] ) 09/15/2021   • Overweight 08/03/2021   • GERD (gastroesophageal reflux disease) 08/03/2021   • Status post laparoscopic sleeve gastrectomy 05/26/2021   • Postsurgical malabsorption 05/26/2021   • Vitamin B 12 deficiency 02/16/2018   • Psoriasis 01/19/2016   • History of renal cell carcinoma 04/15/2015   • Bipolar affective disorder ([place] [address] ) 09/05/2014   • Chronic nonalcoholic liver disease 50/38/0865   • Chronic airway obstruction ([place] [address] ) 02/04/2014   • Essential hypertension 10/03/2012     She  has a past surgical history that includes Cholecystectomy; Colonoscopy; EGD; Hernia repair; Hysterectomy; Nephrectomy (Left); Joint replacement (Left); Tonsillectomy; [place] tooth extraction; and GASTRECTOMY SLEEVE LAPAROSCOPIC (09/2017)  Her Family history is unknown by patient  She  reports that she has quit smoking  She has a 3 00 pack-year smoking history  She has never used smokeless tobacco  She reports that she does not currently use alcohol  She reports that she does not use drugs    Current Outpatient Medications   Medication Sig Dispense Refill   • albuterol (PROVENTIL HFA,VENTOLIN HFA) 90 mcg/act inhaler      • buPROPion (WELLBUTRIN XL) 300 mg 24 hr tablet      • clonazePAM (KlonoPIN) 0 5 mg tablet      • Diclofenac Sodium (VOLTAREN) 1 % Apply 1 g topically daily     • Docusate Sodium (DSS) 100 MG CAPS Take 100 mg by mouth     • EPINEPHrine (EPIPEN) 0 3 mg/0 3 mL SOAJ      • famotidine (PEPCID) 10 mg tablet Take 1 tablet (10 mg total) by mouth 2 (two) times a day 60 tablet 1   • FLUoxetine (PROzac) 20 mg capsule      • fluticasone (FLONASE) 50 mcg/act nasal spray      • ipratropium (ATROVENT) 0 06 % nasal spray 1-2 sprays into each nostril 4 (four) times a day as needed     • loratadine (CLARITIN) 10 mg tablet Take 1 tablet by mouth daily     • naltrexone (REVIA) 50 mg tablet Take 1 tablet (50 mg total) by mouth daily 30 tablet 1   • oxybutynin (DITROPAN-XL) 10 MG 24 hr tablet Take 10 mg by mouth daily     • pantoprazole (PROTONIX) 40 mg tablet Take 1 tablet (40 mg total) by mouth 2 (two) times a day 60 tablet 3   • prazosin (MINIPRESS) 5 mg capsule      • traZODone (DESYREL) 150 mg tablet Take 150 mg by mouth daily     • vitamin B-12 (CYANOCOBALAMIN) 250 MCG tablet Take 250 mcg by mouth daily         No current facility-administered medications for this visit       Current Outpatient Medications on File Prior to Visit   Medication Sig   • albuterol (PROVENTIL HFA,VENTOLIN HFA) 90 mcg/act inhaler    • buPROPion (WELLBUTRIN XL) 300 mg 24 hr tablet    • clonazePAM (KlonoPIN) 0 5 mg tablet    • Diclofenac Sodium (VOLTAREN) 1 % Apply 1 g topically daily   • Docusate Sodium (DSS) 100 MG CAPS Take 100 mg by mouth   • EPINEPHrine (EPIPEN) 0 3 mg/0 3 mL SOAJ    • famotidine (PEPCID) 10 mg tablet Take 1 tablet (10 mg total) by mouth 2 (two) times a day   • FLUoxetine (PROzac) 20 mg capsule    • fluticasone (FLONASE) 50 mcg/act nasal spray    • ipratropium (ATROVENT) 0 06 % nasal spray 1-2 sprays into each nostril 4 (four) times a day as needed   • loratadine (CLARITIN) 10 mg tablet Take 1 tablet by mouth daily   • oxybutynin (DITROPAN-XL) 10 MG 24 hr tablet Take 10 mg by mouth daily   • pantoprazole (PROTONIX) 40 mg tablet Take 1 tablet (40 mg total) by mouth 2 (two) times a day   • prazosin (MINIPRESS) 5 mg capsule    • traZODone (DESYREL) 150 mg tablet Take 150 mg by mouth daily   • vitamin B-12 (CYANOCOBALAMIN) 250 MCG tablet Take 250 mcg by mouth daily     • [DISCONTINUED] Breo Ellipta 100-25 MCG/INH inhaler Inhale 1 puff as needed   • [DISCONTINUED] buPROPion (WELLBUTRIN XL) 150 mg 24 hr tablet Take 80 mg by mouth daily   • [DISCONTINUED] hydrochlorothiazide (HYDRODIURIL) 12 5 mg tablet Take 12 5 mg by mouth daily   • [DISCONTINUED] Linzess 290 MCG CAPS Take 290 mg by mouth daily (Patient not taking: No sig reported)   • [DISCONTINUED] lurasidone (LATUDA) 80 mg tablet Take 80 mg by mouth    • [DISCONTINUED] methocarbamol (ROBAXIN) 500 mg tablet Take 1 tablet by mouth daily (Patient not taking: No sig reported)   • [DISCONTINUED] pantoprazole (PROTONIX) 40 mg tablet Take 1 tablet (40 mg total) by mouth 2 (two) times a day   • [DISCONTINUED] phentermine (ADIPEX-P) 37 5 MG tablet Take 1 tablet (37 5 mg total) by mouth in the morning   • [DISCONTINUED] sucralfate (CARAFATE) 1 g tablet Take 1 tablet (1 g total) by mouth 4 (four) times a day Please crush pill and mix with small amount of water to take as a suspension  • [DISCONTINUED] topiramate (Topamax) 50 MG tablet Take 1 tablet (50 mg total) by mouth every 12 (twelve) hours (Patient not taking: No sig reported)     No current facility-administered medications on file prior to visit  She is allergic to bee venom, fish allergy - food allergy, and metformin       Review of Systems   Constitutional: Negative for fever  Respiratory: Negative for shortness of breath  Cardiovascular: Negative for chest pain  Gastrointestinal: Negative for abdominal pain, constipation, diarrhea and vomiting     Genitourinary: Negative for difficulty urinating  Musculoskeletal: Positive for arthralgias  Skin: Negative for rash  Neurological: Negative for headaches  Psychiatric/Behavioral: Negative for dysphoric mood  The patient is not nervous/anxious  Objective:    /80   Pulse 74   Resp 16   Ht 5' 3" (1 6 m)   Wt 75 8 kg (167 lb)   BMI 29 58 kg/m²      Physical Exam  Vitals and nursing note reviewed  Constitutional:       General: She is not in acute distress  Appearance: She is well-developed  Comments: Overweight     HENT:      Head: Normocephalic and atraumatic  Eyes:      Conjunctiva/sclera: Conjunctivae normal    Neck:      Thyroid: No thyromegaly  Pulmonary:      Effort: Pulmonary effort is normal  No respiratory distress  Skin:     Findings: No rash (visible)  Neurological:      Mental Status: She is alert and oriented to person, place, and time     Psychiatric:         Mood and Affect: Mood normal          Behavior: Behavior normal

## 2022-12-14 NOTE — ASSESSMENT & PLAN NOTE
-Patient is pursuing Conservative Program  -Initial weight loss goal of 5-10% weight loss for improved health    Initial:160 3 lbs   Current:167  Change:+7 3 lb   GOAL: 135-141 lbs     Goals:  -to restart food logging  -discussed continuing on stationary bike even if doing short increments  Discussed slowly building time on the bike  -continue with water intake    Medications  -to add on naltrexone since already taking wellbutrin to mimic effect of contrave      -Prior meds-has a metformin allergy, avoid topamax due to mood changes

## 2023-02-06 DIAGNOSIS — E66.3 OVERWEIGHT: ICD-10-CM

## 2023-02-06 RX ORDER — NALTREXONE HYDROCHLORIDE 50 MG/1
50 TABLET, FILM COATED ORAL DAILY
Qty: 30 TABLET | Refills: 1 | Status: SHIPPED | OUTPATIENT
Start: 2023-02-06

## 2023-02-28 ENCOUNTER — OFFICE VISIT (OUTPATIENT)
Dept: BARIATRICS | Facility: CLINIC | Age: 63
End: 2023-02-28

## 2023-02-28 VITALS
DIASTOLIC BLOOD PRESSURE: 73 MMHG | WEIGHT: 171.6 LBS | SYSTOLIC BLOOD PRESSURE: 118 MMHG | HEIGHT: 63 IN | BODY MASS INDEX: 30.41 KG/M2 | HEART RATE: 78 BPM

## 2023-02-28 DIAGNOSIS — K76.9 CHRONIC NONALCOHOLIC LIVER DISEASE: ICD-10-CM

## 2023-02-28 DIAGNOSIS — J44.9 CHRONIC AIRWAY OBSTRUCTION (HCC): ICD-10-CM

## 2023-02-28 DIAGNOSIS — K91.2 POSTSURGICAL MALABSORPTION: ICD-10-CM

## 2023-02-28 DIAGNOSIS — I10 ESSENTIAL HYPERTENSION: ICD-10-CM

## 2023-02-28 DIAGNOSIS — Z98.84 STATUS POST LAPAROSCOPIC SLEEVE GASTRECTOMY: ICD-10-CM

## 2023-02-28 DIAGNOSIS — E66.9 OBESITY, CLASS I, BMI 30-34.9: Primary | ICD-10-CM

## 2023-02-28 DIAGNOSIS — E53.8 VITAMIN B 12 DEFICIENCY: ICD-10-CM

## 2023-02-28 PROBLEM — E66.3 OVERWEIGHT: Status: RESOLVED | Noted: 2021-08-03 | Resolved: 2023-02-28

## 2023-02-28 PROBLEM — E66.811 OBESITY, CLASS I, BMI 30-34.9: Status: ACTIVE | Noted: 2023-02-28

## 2023-02-28 RX ORDER — MULTIVIT-MIN/IRON FUM/FOLIC AC 7.5 MG-4
1 TABLET ORAL DAILY
COMMUNITY

## 2023-02-28 NOTE — PATIENT INSTRUCTIONS
General Recommendations:  Nutrition:  Eat breakfast daily  Do not skip meals  Food log (ie ) www myfitnesspal com, sparkpeople  com, loseit com, calorieking  com, etc     Practice mindful eating  Be sure to set aside time to eat, eat slowly, and savor your food  Hydration: At least 64oz of water daily  No sugar sweetened beverages  No juice (eat the fruit instead)  Exercise:  Studies have shown that the ideal exercise goal is somewhere between 150 to 300 minutes of moderate intensity exercise a week  Start with exercising 10 minutes every other day and gradually increase physical activity with a goal of at least 150 minutes of moderate intensity exercise a week, divided over at least 3 days a week  An example of this would be exercising 30 minutes a day, 5 days a week  Resistance training can increase muscle mass and increase our resting metabolic rate  FULL BODY resistance training is recommended 2-3 times a week  Do not do this on consecutive days to allow for muscle recovery  Aim for a bare minimum 5000 steps, even on days you do not exercise  Monitoring:   Weigh yourself daily  If this causes undue stress, then just weigh yourself once a week  Weigh yourself the same time of the day with the same amount of clothing on  Preferably this should be done after waking up, before you eat, and with no clothing or minimal clothing on  Specific Goals:  Food log (ie ) www myfitnesspal com,sparkpeople  com,loseit com,calorieking  com,etc   and No sugary beverages  At least 64oz of water daily  Calorie goal:  0485-0831 calorie meal plan given    Weangellaovjorge 0 25mg weekly  Contact the office with an update in 3-4 weeks for refill  If you are doing fine we will increase to 0 5mg weekly  Visit wegovy  com for further information/injection instructions  Please eat small frequent meals to help reduce nausea  Lemon water and saltine crackers may help with this    Monitor your resting heart rate and contact the office if it is greater than 100  If you experience fever, nausea/vomiting, and pain radiating to your back this may be a sign of pancreatitis  Please go to the emergency room if this occurs       Return visit:  2 months

## 2023-02-28 NOTE — PROGRESS NOTES
Assessment/Plan:  Franky Stephens was seen today for follow-up  Diagnoses and all orders for this visit:    Obesity, Class I, BMI 30-34 9  -     Semaglutide-Weight Management (WEGOVY) 0 25 MG/0 5ML; Inject 0 5 mL (0 25 mg total) under the skin once a week for 28 days    Status post laparoscopic sleeve gastrectomy  -     Semaglutide-Weight Management (WEGOVY) 0 25 MG/0 5ML; Inject 0 5 mL (0 25 mg total) under the skin once a week for 28 days    Postsurgical malabsorption    Vitamin B 12 deficiency    Essential hypertension  -     Semaglutide-Weight Management (WEGOVY) 0 25 MG/0 5ML; Inject 0 5 mL (0 25 mg total) under the skin once a week for 28 days    Chronic nonalcoholic liver disease  -     Semaglutide-Weight Management (WEGOVY) 0 25 MG/0 5ML; Inject 0 5 mL (0 25 mg total) under the skin once a week for 28 days    Chronic airway obstruction (HCC)       Obesity, Class I, BMI 30-34 9    - Weight not at goal  - Patient is interested in Conservative Program  - Labs reviewed: As below  - Patient denies personal history of pancreatitis  Patient also denies personal and family history of medullary thyroid cancer and multiple endocrine neoplasia type 2 (MEN 2 tumor)  General Recommendations:  Nutrition:  Eat breakfast daily  Do not skip meals  Food log (ie ) www myfitnesspal com, sparkpeople  com, loseit com, calorieking  com, etc     Practice mindful eating  Be sure to set aside time to eat, eat slowly, and savor your food  Hydration: At least 64oz of water daily  No sugar sweetened beverages  No juice (eat the fruit instead)  Exercise:  Studies have shown that the ideal exercise goal is somewhere between 150 to 300 minutes of moderate intensity exercise a week  Start with exercising 10 minutes every other day and gradually increase physical activity with a goal of at least 150 minutes of moderate intensity exercise a week, divided over at least 3 days a week    An example of this would be exercising 30 minutes a day, 5 days a week  Resistance training can increase muscle mass and increase our resting metabolic rate  FULL BODY resistance training is recommended 2-3 times a week  Do not do this on consecutive days to allow for muscle recovery  Aim for a bare minimum 5000 steps, even on days you do not exercise  Monitoring:   Weigh yourself daily  If this causes undue stress, then just weigh yourself once a week  Weigh yourself the same time of the day with the same amount of clothing on  Preferably this should be done after waking up, before you eat, and with no clothing or minimal clothing on  Specific Goals:  Food log (ie ) www myfitnesspal com,sparkpeople  com,loseit com,calorieking  com,etc   and No sugary beverages  At least 64oz of water daily  Calorie goal:  4907-5244 calorie meal plan given    Weegovy 0 25mg weekly  Contact the office with an update in 3-4 weeks for refill  If you are doing fine we will increase to 0 5mg weekly  Visit wegovy  com for further information/injection instructions  Please eat small frequent meals to help reduce nausea  Lemon water and saltine crackers may help with this  Monitor your resting heart rate and contact the office if it is greater than 100  If you experience fever, nausea/vomiting, and pain radiating to your back this may be a sign of pancreatitis  Please go to the emergency room if this occurs       Return visit:  2 months         Total time spent reviewing chart, interviewing patient, examining patient, discussing plan, answering all questions, and documentin min        ______________________________________________________________________        Subjective:   Chief Complaint   Patient presents with   • Follow-up     MWM follow up      Patient here to discuss weight associated problems and nutrition goals  HPI: Cari Elvis  is a 65 y o  female with history of hypertension, MCNEILL, bipolar affective disorder, sleeve gastrectomy, vitamin B12 deficiency, and excess weight  Vertical sleeve gastrectomy was done in 2018 by Dr Rory Chappell at [facility]   She follows with Dr Ana María Kwan (surgeon) and Christina Clancy (medical weight management)  This is my first time seeing the patient in follow-up on behalf of [address] [place] [address]  Most recent notes and records were reviewed  Review of note from 6/21/2022 shows that she was formerly on Topamax 200 mg total daily dose which was decreased to 100 mg per her psychiatrist recommendations due to increased agitation  She is also on Wellbutrin  mg daily (rx by psychiatrist) and phentermine 37 5 mg daily (rx by MWM)  Last appointment with Khai Amado was on 12/14/2022  At that time it was reported that her psychiatrist advised her to stop topiramate altogether  Reviewed record which shows that she was started on naltrexone 50 mg daily at that time  Wt Readings from Last 10 Encounters:   02/28/23 77 8 kg (171 lb 9 6 oz)   12/14/22 75 8 kg (167 lb)   07/14/22 66 7 kg (147 lb)   06/29/22 65 3 kg (144 lb)   06/21/22 65 1 kg (143 lb 8 oz)   04/29/22 65 8 kg (145 lb)   03/28/22 63 3 kg (139 lb 8 oz)   03/21/22 63 8 kg (140 lb 11 2 oz)   12/20/21 66 5 kg (146 lb 11 2 oz)   10/25/21 68 1 kg (150 lb 1 6 oz)     Presurgery weight 272  Baltazar 136  Initial weight establishing with (MW): 160  Last OV weight: 167  Current weight: 171    "I'm just hungry all the time"  Topamax cause agitation and blurred vision  Naltrexone had no benefit  Patient gave up on this after a 3 month trial       Patient denies personal and family history of  pancreatitis, thyroid cancer, MEN-2 tumors  Denies any hx of glaucoma, seizures, kidney stones  H/o cholecystectomy  Denies Hx of CAD, PAD, palpitations, arrhythmia       The following portions of the patient's history were reviewed and updated as appropriate: allergies, current medications, past family history, past medical history, past social history, past surgical history, and problem list     Review Of Systems:  Review of Systems   Constitutional: Negative for activity change, appetite change, fatigue and fever  Respiratory: Negative for cough and shortness of breath  Cardiovascular: Negative for chest pain, palpitations and leg swelling  Gastrointestinal: Negative for abdominal pain, constipation, diarrhea, nausea and vomiting  Endocrine: Negative for cold intolerance and heat intolerance  Genitourinary: Negative for difficulty urinating and dysuria  Musculoskeletal: Negative for arthralgias, back pain and gait problem  Skin: Negative for pallor and rash  Neurological: Negative for headaches  Psychiatric/Behavioral: Negative for dysphoric mood, sleep disturbance and suicidal ideas (or HI)  The patient is not nervous/anxious  Objective:  /73 (BP Location: Left arm, Patient Position: Sitting, Cuff Size: Adult)   Pulse 78   Ht 5' 3" (1 6 m)   Wt 77 8 kg (171 lb 9 6 oz)   BMI 30 40 kg/m²   Physical Exam  Vitals and nursing note reviewed  Constitutional:       General: She is not in acute distress  Appearance: Normal appearance  She is not ill-appearing or diaphoretic  Eyes:      General: No scleral icterus  Cardiovascular:      Rate and Rhythm: Normal rate and regular rhythm  Pulses: Normal pulses  Heart sounds: No murmur heard  Pulmonary:      Effort: Pulmonary effort is normal  No respiratory distress  Breath sounds: Normal breath sounds  No wheezing or rhonchi  Abdominal:      General: Bowel sounds are normal  There is no distension  Palpations: Abdomen is soft  There is no mass  Tenderness: There is no abdominal tenderness  Musculoskeletal:      Cervical back: Neck supple  Right lower leg: No edema  Left lower leg: No edema  Lymphadenopathy:      Cervical: No cervical adenopathy  Skin:     Capillary Refill: Capillary refill takes less than 2 seconds  Findings: No lesion or rash     Neurological: Mental Status: She is alert and oriented to person, place, and time  Gait: Gait normal    Psychiatric:         Mood and Affect: Mood normal          Behavior: Behavior normal          Labs and Imaging  Recent labs and imaging have been personally reviewed    Lab Results   Component Value Date    WBC 5 90 05/18/2021    HGB 12 3 05/18/2021    HCT 39 3 05/18/2021    MCV 92 05/18/2021     05/18/2021     Lab Results   Component Value Date    SODIUM 143 05/18/2021    K 4 3 05/18/2021     (H) 05/18/2021    CO2 30 05/18/2021    AGAP 3 (L) 05/18/2021    BUN 16 05/18/2021    CREATININE 0 80 05/18/2021    GLUF 79 05/18/2021    CALCIUM 9 3 05/18/2021    AST 8 05/18/2021    ALT 20 05/18/2021    ALKPHOS 62 05/18/2021    TP 6 6 05/18/2021    TBILI 0 37 05/18/2021    EGFR 81 05/18/2021     Lab Results   Component Value Date    HGBA1C 5 0 11/11/2022

## 2023-02-28 NOTE — ASSESSMENT & PLAN NOTE
- Weight not at goal  - Patient is interested in Conservative Program  - Labs reviewed: As below  - Patient denies personal history of pancreatitis  Patient also denies personal and family history of medullary thyroid cancer and multiple endocrine neoplasia type 2 (MEN 2 tumor)  General Recommendations:  Nutrition:  Eat breakfast daily  Do not skip meals  Food log (ie ) www myfitnesspal com, sparkpeople  com, loseit com, calorieking  com, etc     Practice mindful eating  Be sure to set aside time to eat, eat slowly, and savor your food  Hydration: At least 64oz of water daily  No sugar sweetened beverages  No juice (eat the fruit instead)  Exercise:  Studies have shown that the ideal exercise goal is somewhere between 150 to 300 minutes of moderate intensity exercise a week  Start with exercising 10 minutes every other day and gradually increase physical activity with a goal of at least 150 minutes of moderate intensity exercise a week, divided over at least 3 days a week  An example of this would be exercising 30 minutes a day, 5 days a week  Resistance training can increase muscle mass and increase our resting metabolic rate  FULL BODY resistance training is recommended 2-3 times a week  Do not do this on consecutive days to allow for muscle recovery  Aim for a bare minimum 5000 steps, even on days you do not exercise  Monitoring:   Weigh yourself daily  If this causes undue stress, then just weigh yourself once a week  Weigh yourself the same time of the day with the same amount of clothing on  Preferably this should be done after waking up, before you eat, and with no clothing or minimal clothing on  Specific Goals:  Food log (ie ) www myfitnesspal com,sparkpeople  com,loseit com,calorieking  com,etc   and No sugary beverages  At least 64oz of water daily  Calorie goal:  9232-2866 calorie meal plan given    Weegovy 0 25mg weekly    Contact the office with an update in 3-4 weeks for refill  If you are doing fine we will increase to 0 5mg weekly  Visit wegovy  com for further information/injection instructions  Please eat small frequent meals to help reduce nausea  Lemon water and saltine crackers may help with this  Monitor your resting heart rate and contact the office if it is greater than 100  If you experience fever, nausea/vomiting, and pain radiating to your back this may be a sign of pancreatitis  Please go to the emergency room if this occurs       Return visit:  2 months

## 2023-03-01 ENCOUNTER — TELEPHONE (OUTPATIENT)
Dept: OTHER | Facility: OTHER | Age: 63
End: 2023-03-01

## 2023-03-01 NOTE — TELEPHONE ENCOUNTER
Patient calling  Her insurance denied the priorauthorization for [facility]  They are stating that they will not cover any medications for weight loss  She is wondering if she can try going back on the phentermine  Please call back to advise

## 2023-03-02 ENCOUNTER — TELEPHONE (OUTPATIENT)
Dept: BARIATRICS | Facility: CLINIC | Age: 63
End: 2023-03-02

## 2023-03-02 NOTE — TELEPHONE ENCOUNTER
Patient called and is asking if the office can give her a call regarding her weight loss medication that the insurance will not pay for

## 2023-03-02 NOTE — TELEPHONE ENCOUNTER
Spoke to the patient she asked since [facility] is not covered by her insurance she would like to go back on the Phentermine or discuss other medication options

## 2023-03-14 ENCOUNTER — TELEPHONE (OUTPATIENT)
Dept: BARIATRICS | Facility: CLINIC | Age: 63
End: 2023-03-14

## 2023-03-14 NOTE — TELEPHONE ENCOUNTER
Received call from Pt re: medications  Per Pt, her insurance will not cover any weight loss medications  Pt requested to try phentermine again as she will be paying out of pocket  Told Pt I will send message to bariatrician notifying him of her request  Pt verbalized understanding and was agreeable to plan

## 2023-03-20 ENCOUNTER — TELEPHONE (OUTPATIENT)
Dept: BARIATRICS | Facility: CLINIC | Age: 63
End: 2023-03-20

## 2023-03-27 ENCOUNTER — OFFICE VISIT (OUTPATIENT)
Dept: BARIATRICS | Facility: CLINIC | Age: 63
End: 2023-03-27

## 2023-03-27 VITALS
SYSTOLIC BLOOD PRESSURE: 130 MMHG | DIASTOLIC BLOOD PRESSURE: 70 MMHG | HEIGHT: 63 IN | TEMPERATURE: 98 F | BODY MASS INDEX: 31.1 KG/M2 | HEART RATE: 78 BPM | WEIGHT: 175.5 LBS

## 2023-03-27 DIAGNOSIS — E66.9 OBESITY, CLASS I, BMI 30-34.9: ICD-10-CM

## 2023-03-27 DIAGNOSIS — K91.2 POSTSURGICAL MALABSORPTION: ICD-10-CM

## 2023-03-27 DIAGNOSIS — R63.5 ABNORMAL WEIGHT GAIN: ICD-10-CM

## 2023-03-27 DIAGNOSIS — Z48.815 ENCOUNTER FOR SURGICAL AFTERCARE FOLLOWING SURGERY ON THE DIGESTIVE SYSTEM: Primary | ICD-10-CM

## 2023-03-27 DIAGNOSIS — Z98.84 BARIATRIC SURGERY STATUS: ICD-10-CM

## 2023-03-27 NOTE — PROGRESS NOTES
"Assessment/Plan:     Patient ID: Kit Casas is a 65 y o  female  Bariatric Surgery Status/Abnormal weight gain    - s/p Vertical Sleeve Gastrectomy with Dr Caity Ron at [facility] 2018  Presents to the office today for annual visit  She is frustrated due to weight gain and insurance is not covering injectable medications  She wants to trial phentermine again as she feel \"I wasn't really into it at that time but now I feel ready to make changes  \" Hurt herself hence exercise has been limited - was doing an hour of biking or treadmill 4 times per week  Trying to make healthier choices  Doesn't do 30/60 minute rule  Denies having any abdominal pain, N/V/D/C,regurgitation, reflux or dysphagia  PLAN:     - encouraged healthy habits  Follow up with MWM as scheduled  Do 30/60 minute rule  - Routine follow up in 1 year for annual visit  - Continue with healthy lifestyle, adequate protein intake of 60 gm, fluid intake of at least 64 oz  - Continue with MVI daily  - Activity as tolerated  - Labs ordered and will adjust accordingly if any deficiency  - Follow up with RD and SW as needed  · Continued/Maintain healthy weight loss with good nutrition intakes  · Adequate hydration with at least 64oz  fluid intake  · Follow diet as discussed  · Follow vitamin and mineral recommendations as reviewed with you  · Exercise as tolerated  · Colonoscopy referral made: UTD  · Mammogram - UTD    · Follow-up in 1 year for annual visit  We kindly ask that your arrive 15 minutes before your scheduled appointment time with your provider to allow our staff to room you, get your vital signs and update your chart  · Get lab work done prior to annual visit  Please call the office if you need a script  It is recommended to check with your insurance BEFORE getting labs done to make sure they are covered by your policy        · Call our office if you have any problems with abdominal pain especially " associated with fever, chills, nausea, vomiting or any other concerns  · All  Post-bariatric surgery patients should be aware that very small quantities of any alcohol can cause impairment and it is very possible not to feel the effect  The effect can be in the system for several hours  It is also a stomach irritant  · It is advised to AVOID alcohol, Nonsteroidal antiinflammatory drugs (NSAIDS) and nicotine of all forms   Any of these can cause stomach irritation/pain  · Discussed the effects of alcohol on a bariatric patient and the increased impairment risk  · Keep up the good work! Postsurgical Malabsorption   -At risk for malabsorption of vitamins/minerals secondary to malabsorption and restriction of intake from bariatric surgery  -Currently taking adequate postop bariatric surgery vitamin supplementation  -Next set of bariatric labs ordered for approximately 2 weeks  -Patient received education about the importance of adhering to a lifelong supplementation regimen to avoid vitamin/mineral deficiencies      Diagnoses and all orders for this visit:    Encounter for surgical aftercare following surgery on the digestive system  -     Zinc; Future  -     Vitamin B1, whole blood; Future  -     Vitamin A; Future  -     PTH, intact; Future  -     Ferritin; Future  -     Folate; Future  -     Iron Saturation %; Future    Bariatric surgery status  -     Zinc; Future  -     Vitamin B1, whole blood; Future  -     Vitamin A; Future  -     PTH, intact; Future  -     Ferritin; Future  -     Folate; Future  -     Iron Saturation %; Future    Postsurgical malabsorption  -     Zinc; Future  -     Vitamin B1, whole blood; Future  -     Vitamin A; Future  -     PTH, intact; Future  -     Ferritin; Future  -     Folate; Future  -     Iron Saturation %; Future    Obesity, Class I, BMI 30-34 9  -     Zinc; Future  -     Vitamin B1, whole blood; Future  -     Vitamin A; Future  -     PTH, intact;  Future  - "Ferritin; Future  -     Folate; Future  -     Iron Saturation %; Future    Abnormal weight gain  -     Zinc; Future  -     Vitamin B1, whole blood; Future  -     Vitamin A; Future  -     PTH, intact; Future  -     Ferritin; Future  -     Folate; Future  -     Iron Saturation %; Future         Subjective:      Patient ID: Arcadio Matthews is a 65 y o  female  - s/p Vertical [place] Left Dr Damon Seen at [facility] 2018  Presents to the office today for annual visit  She is frustrated due to weight gain and insurance is not covering injectable medications  She wants to trial phentermine again as she feel \"I wasn't really into it at that time but now I feel ready to make changes  \" Hurt herself hence exercise has been limited - was doing an hour of biking or treadmill 4 times per week  Trying to make healthier choices  Doesn't do 30/60 minute rule  Denies having any abdominal pain, N/V/D/C,regurgitation, reflux or dysphagia  Initial:  260s  lbs  Current: 175 5 lbs  EWL: (Weight loss is ahead of schedule at this post surgical period )  Baltazar: 137 lbs  Current BMI is Body mass index is 31 09 kg/m²  · Tolerating a regular diet-yes  · Eating at least 60 grams of protein per day-yes   · Following 30/60 minute rule with liquids-no  · Drinking at least 64 ounces of fluid per day-yes  · Drinking carbonated beverages-no  · Sufficient exercise-yes - started this  4x per week - biking, walking and treadmill  · Using NSAIDs regularly-no  · Using nicotine-no  · Using alcohol-no  · Supplements: Multivitamins and Calcium     · EWL is , which places the patient ahead of schedule for expected post surgical weight loss at this time  The following portions of the patient's history were reviewed and updated as appropriate: allergies, current medications, past family history, past medical history, past social history, past surgical history and problem list     Review of Systems   Constitutional: Negative    " "  Respiratory: Positive for shortness of breath (with activity)  Cardiovascular: Negative  Gastrointestinal: Negative  Musculoskeletal: Positive for arthralgias and back pain  Neurological: Positive for dizziness  Objective:    /70   Pulse 78   Temp 98 °F (36 7 °C) (Tympanic)   Ht 5' 3\" (1 6 m)   Wt 79 6 kg (175 lb 8 oz)   BMI 31 09 kg/m²      Physical Exam  Vitals and nursing note reviewed  Constitutional:       Appearance: Normal appearance  She is obese  Cardiovascular:      Rate and Rhythm: Normal rate and regular rhythm  Pulses: Normal pulses  Heart sounds: Normal heart sounds  Pulmonary:      Effort: Pulmonary effort is normal       Breath sounds: Normal breath sounds  Abdominal:      General: Bowel sounds are normal       Palpations: Abdomen is soft  Tenderness: There is no abdominal tenderness  Musculoskeletal:         General: Normal range of motion  Skin:     General: Skin is warm and dry  Neurological:      General: No focal deficit present  Mental Status: She is alert and oriented to person, place, and time  Psychiatric:         Mood and Affect: Mood normal          Behavior: Behavior normal          Thought Content:  Thought content normal          Judgment: Judgment normal              "

## 2023-04-03 ENCOUNTER — TELEPHONE (OUTPATIENT)
Dept: PSYCHIATRY | Facility: CLINIC | Age: 63
End: 2023-04-03

## 2023-04-03 NOTE — TELEPHONE ENCOUNTER
Vincent Guy and/or Patient requested a call back to discuss patients insurance is now [place] [employer]   Will go to [place]    They can be reached at P# [phone]  Thank you

## 2023-04-04 NOTE — TELEPHONE ENCOUNTER
Contacted patient in regards to previous encounter in attempts to obtain patient new insurance information and put into patient chart  Spoke w  Patient they [facility]   Writer apologized for the confusion and notified patient call will be made when availability arises         [facility]   No provider preference     Mikaela Montes De Oca = Still Active   [email or web address] Plan   [place]:[phone]

## 2023-04-06 ENCOUNTER — TELEPHONE (OUTPATIENT)
Dept: BARIATRICS | Facility: CLINIC | Age: 63
End: 2023-04-06

## 2023-04-06 NOTE — TELEPHONE ENCOUNTER
Called pt to review lab results, pt understood what was communicate and advised  ----- Message from Arely Humphries sent at 4/6/2023  8:58 AM EDT -----  Please call patient to let her know all her labs are within limits  Her Vitamin B1 level is elevated but this is not harmful  Continue with her supplements as is       Thank you,  Deyanira Wolfe

## 2023-07-17 ENCOUNTER — TELEPHONE (OUTPATIENT)
Dept: BARIATRICS | Facility: CLINIC | Age: 63
End: 2023-07-17

## 2023-07-17 DIAGNOSIS — E66.9 OBESITY, CLASS I, BMI 30-34.9: Primary | ICD-10-CM

## 2023-07-17 RX ORDER — NALTREXONE HYDROCHLORIDE 50 MG/1
50 TABLET, FILM COATED ORAL DAILY
Qty: 90 TABLET | Refills: 0 | Status: SHIPPED | OUTPATIENT
Start: 2023-07-17

## 2023-07-17 NOTE — TELEPHONE ENCOUNTER
Pt called asking if she can get a script of Naltrexone till her Appt in October 6th till she is seen.

## 2023-09-25 ENCOUNTER — TELEPHONE (OUTPATIENT)
Dept: BARIATRICS | Facility: CLINIC | Age: 63
End: 2023-09-25

## 2025-04-17 ENCOUNTER — TELEPHONE (OUTPATIENT)
Age: 65
End: 2025-04-17

## 2025-04-17 NOTE — TELEPHONE ENCOUNTER
PT wanted to know the name of the medication she was on, naltrexone.  I also gave her the phone number for the medical records dept since she wants all her records sent to her new provider